# Patient Record
Sex: MALE | Race: WHITE | NOT HISPANIC OR LATINO | Employment: OTHER | URBAN - METROPOLITAN AREA
[De-identification: names, ages, dates, MRNs, and addresses within clinical notes are randomized per-mention and may not be internally consistent; named-entity substitution may affect disease eponyms.]

---

## 2018-08-15 ENCOUNTER — HOSPITAL ENCOUNTER (INPATIENT)
Facility: MEDICAL CENTER | Age: 68
LOS: 5 days | DRG: 602 | End: 2018-08-20
Attending: EMERGENCY MEDICINE | Admitting: HOSPITALIST
Payer: MEDICARE

## 2018-08-15 ENCOUNTER — HOSPITAL ENCOUNTER (OUTPATIENT)
Dept: RADIOLOGY | Facility: MEDICAL CENTER | Age: 68
End: 2018-08-15

## 2018-08-15 ENCOUNTER — APPOINTMENT (OUTPATIENT)
Dept: RADIOLOGY | Facility: MEDICAL CENTER | Age: 68
DRG: 602 | End: 2018-08-15
Attending: EMERGENCY MEDICINE
Payer: MEDICARE

## 2018-08-15 DIAGNOSIS — I48.21 PERMANENT ATRIAL FIBRILLATION (HCC): ICD-10-CM

## 2018-08-15 DIAGNOSIS — Z87.39 HISTORY OF OSTEOMYELITIS: ICD-10-CM

## 2018-08-15 DIAGNOSIS — L03.115 CELLULITIS OF RIGHT LEG: ICD-10-CM

## 2018-08-15 LAB
ALBUMIN SERPL BCP-MCNC: 3.2 G/DL (ref 3.2–4.9)
ALBUMIN/GLOB SERPL: 1.4 G/DL
ALP SERPL-CCNC: 57 U/L (ref 30–99)
ALT SERPL-CCNC: 22 U/L (ref 2–50)
ANION GAP SERPL CALC-SCNC: 9 MMOL/L (ref 0–11.9)
ANISOCYTOSIS BLD QL SMEAR: ABNORMAL
AST SERPL-CCNC: 30 U/L (ref 12–45)
BASOPHILS # BLD AUTO: 0 % (ref 0–1.8)
BASOPHILS # BLD: 0 K/UL (ref 0–0.12)
BILIRUB SERPL-MCNC: 0.9 MG/DL (ref 0.1–1.5)
BNP SERPL-MCNC: 1808 PG/ML (ref 0–100)
BUN SERPL-MCNC: 58 MG/DL (ref 8–22)
CALCIUM SERPL-MCNC: 8 MG/DL (ref 8.5–10.5)
CHLORIDE SERPL-SCNC: 102 MMOL/L (ref 96–112)
CO2 SERPL-SCNC: 22 MMOL/L (ref 20–33)
CREAT SERPL-MCNC: 2.25 MG/DL (ref 0.5–1.4)
CRP SERPL HS-MCNC: 30.72 MG/DL (ref 0–0.75)
EOSINOPHIL # BLD AUTO: 0 K/UL (ref 0–0.51)
EOSINOPHIL NFR BLD: 0 % (ref 0–6.9)
ERYTHROCYTE [DISTWIDTH] IN BLOOD BY AUTOMATED COUNT: 51.1 FL (ref 35.9–50)
ERYTHROCYTE [SEDIMENTATION RATE] IN BLOOD BY WESTERGREN METHOD: 33 MM/HOUR (ref 0–20)
GLOBULIN SER CALC-MCNC: 2.3 G/DL (ref 1.9–3.5)
GLUCOSE SERPL-MCNC: 79 MG/DL (ref 65–99)
HCT VFR BLD AUTO: 33.7 % (ref 42–52)
HGB BLD-MCNC: 10.7 G/DL (ref 14–18)
INR PPP: 4.16 (ref 0.87–1.13)
LACTATE BLD-SCNC: 2 MMOL/L (ref 0.5–2)
LYMPHOCYTES # BLD AUTO: 0.14 K/UL (ref 1–4.8)
LYMPHOCYTES NFR BLD: 4.4 % (ref 22–41)
MANUAL DIFF BLD: ABNORMAL
MCH RBC QN AUTO: 25.7 PG (ref 27–33)
MCHC RBC AUTO-ENTMCNC: 31.8 G/DL (ref 33.7–35.3)
MCV RBC AUTO: 80.8 FL (ref 81.4–97.8)
MICROCYTES BLD QL SMEAR: ABNORMAL
MONOCYTES # BLD AUTO: 0.09 K/UL (ref 0–0.85)
MONOCYTES NFR BLD AUTO: 2.7 % (ref 0–13.4)
MORPHOLOGY BLD-IMP: NORMAL
NEUTROPHILS # BLD AUTO: 2.97 K/UL (ref 1.82–7.42)
NEUTROPHILS NFR BLD: 73.4 % (ref 44–72)
NEUTS BAND NFR BLD MANUAL: 19.5 % (ref 0–10)
NRBC # BLD AUTO: 0 K/UL
NRBC BLD-RTO: 0 /100 WBC
OVALOCYTES BLD QL SMEAR: NORMAL
PLATELET # BLD AUTO: 45 K/UL (ref 164–446)
PLATELET BLD QL SMEAR: NORMAL
PLATELETS.RETICULATED NFR BLD AUTO: 4.6 K/UL (ref 0.6–13.1)
PMV BLD AUTO: 11.6 FL (ref 9–12.9)
POIKILOCYTOSIS BLD QL SMEAR: NORMAL
POTASSIUM SERPL-SCNC: 4.7 MMOL/L (ref 3.6–5.5)
PROT SERPL-MCNC: 5.5 G/DL (ref 6–8.2)
PROTHROMBIN TIME: 40 SEC (ref 12–14.6)
RBC # BLD AUTO: 4.17 M/UL (ref 4.7–6.1)
RBC BLD AUTO: PRESENT
SODIUM SERPL-SCNC: 133 MMOL/L (ref 135–145)
TROPONIN I SERPL-MCNC: 0.18 NG/ML (ref 0–0.04)
WBC # BLD AUTO: 3.2 K/UL (ref 4.8–10.8)

## 2018-08-15 PROCEDURE — 700111 HCHG RX REV CODE 636 W/ 250 OVERRIDE (IP): Performed by: EMERGENCY MEDICINE

## 2018-08-15 PROCEDURE — 85007 BL SMEAR W/DIFF WBC COUNT: CPT

## 2018-08-15 PROCEDURE — 85027 COMPLETE CBC AUTOMATED: CPT

## 2018-08-15 PROCEDURE — 93005 ELECTROCARDIOGRAM TRACING: CPT | Performed by: EMERGENCY MEDICINE

## 2018-08-15 PROCEDURE — 83880 ASSAY OF NATRIURETIC PEPTIDE: CPT

## 2018-08-15 PROCEDURE — 83605 ASSAY OF LACTIC ACID: CPT

## 2018-08-15 PROCEDURE — 700111 HCHG RX REV CODE 636 W/ 250 OVERRIDE (IP): Performed by: HOSPITALIST

## 2018-08-15 PROCEDURE — 700101 HCHG RX REV CODE 250: Performed by: EMERGENCY MEDICINE

## 2018-08-15 PROCEDURE — 770020 HCHG ROOM/CARE - TELE (206)

## 2018-08-15 PROCEDURE — 85610 PROTHROMBIN TIME: CPT

## 2018-08-15 PROCEDURE — 87040 BLOOD CULTURE FOR BACTERIA: CPT

## 2018-08-15 PROCEDURE — 700105 HCHG RX REV CODE 258: Performed by: HOSPITALIST

## 2018-08-15 PROCEDURE — A9270 NON-COVERED ITEM OR SERVICE: HCPCS | Performed by: EMERGENCY MEDICINE

## 2018-08-15 PROCEDURE — 99222 1ST HOSP IP/OBS MODERATE 55: CPT | Performed by: HOSPITALIST

## 2018-08-15 PROCEDURE — 700105 HCHG RX REV CODE 258: Performed by: EMERGENCY MEDICINE

## 2018-08-15 PROCEDURE — 73700 CT LOWER EXTREMITY W/O DYE: CPT | Mod: RT

## 2018-08-15 PROCEDURE — 93971 EXTREMITY STUDY: CPT

## 2018-08-15 PROCEDURE — 85652 RBC SED RATE AUTOMATED: CPT

## 2018-08-15 PROCEDURE — 700102 HCHG RX REV CODE 250 W/ 637 OVERRIDE(OP): Performed by: EMERGENCY MEDICINE

## 2018-08-15 PROCEDURE — 84484 ASSAY OF TROPONIN QUANT: CPT

## 2018-08-15 PROCEDURE — 80053 COMPREHEN METABOLIC PANEL: CPT

## 2018-08-15 PROCEDURE — 85055 RETICULATED PLATELET ASSAY: CPT

## 2018-08-15 PROCEDURE — 86140 C-REACTIVE PROTEIN: CPT

## 2018-08-15 RX ORDER — ESOMEPRAZOLE MAGNESIUM 40 MG/1
40 CAPSULE, DELAYED RELEASE ORAL
COMMUNITY

## 2018-08-15 RX ORDER — POLYETHYLENE GLYCOL 3350 17 G/17G
1 POWDER, FOR SOLUTION ORAL
Status: DISCONTINUED | OUTPATIENT
Start: 2018-08-15 | End: 2018-08-20 | Stop reason: HOSPADM

## 2018-08-15 RX ORDER — CARVEDILOL 25 MG/1
25 TABLET ORAL 2 TIMES DAILY WITH MEALS
Status: ON HOLD | COMMUNITY
End: 2018-08-20

## 2018-08-15 RX ORDER — BISACODYL 10 MG
10 SUPPOSITORY, RECTAL RECTAL
Status: DISCONTINUED | OUTPATIENT
Start: 2018-08-15 | End: 2018-08-20 | Stop reason: HOSPADM

## 2018-08-15 RX ORDER — SODIUM CHLORIDE 9 MG/ML
30 INJECTION, SOLUTION INTRAVENOUS
Status: DISCONTINUED | OUTPATIENT
Start: 2018-08-15 | End: 2018-08-20 | Stop reason: HOSPADM

## 2018-08-15 RX ORDER — ONDANSETRON 2 MG/ML
4 INJECTION INTRAMUSCULAR; INTRAVENOUS EVERY 4 HOURS PRN
Status: DISCONTINUED | OUTPATIENT
Start: 2018-08-15 | End: 2018-08-20 | Stop reason: HOSPADM

## 2018-08-15 RX ORDER — OXYCODONE HYDROCHLORIDE 5 MG/1
5 TABLET ORAL
Status: DISCONTINUED | OUTPATIENT
Start: 2018-08-15 | End: 2018-08-20 | Stop reason: HOSPADM

## 2018-08-15 RX ORDER — ONDANSETRON 4 MG/1
4 TABLET, ORALLY DISINTEGRATING ORAL EVERY 4 HOURS PRN
Status: DISCONTINUED | OUTPATIENT
Start: 2018-08-15 | End: 2018-08-20 | Stop reason: HOSPADM

## 2018-08-15 RX ORDER — BUMETANIDE 1 MG/1
2 TABLET ORAL EVERY MORNING
COMMUNITY

## 2018-08-15 RX ORDER — OXYCODONE HYDROCHLORIDE AND ACETAMINOPHEN 5; 325 MG/1; MG/1
1 TABLET ORAL ONCE
Status: COMPLETED | OUTPATIENT
Start: 2018-08-15 | End: 2018-08-15

## 2018-08-15 RX ORDER — SODIUM CHLORIDE 9 MG/ML
500 INJECTION, SOLUTION INTRAVENOUS
Status: DISCONTINUED | OUTPATIENT
Start: 2018-08-15 | End: 2018-08-20 | Stop reason: HOSPADM

## 2018-08-15 RX ORDER — LOSARTAN POTASSIUM 100 MG/1
150 TABLET ORAL EVERY MORNING
Status: ON HOLD | COMMUNITY
End: 2018-08-20

## 2018-08-15 RX ORDER — SODIUM CHLORIDE 9 MG/ML
INJECTION, SOLUTION INTRAVENOUS CONTINUOUS
Status: DISCONTINUED | OUTPATIENT
Start: 2018-08-15 | End: 2018-08-16

## 2018-08-15 RX ORDER — OXYCODONE HYDROCHLORIDE 10 MG/1
10 TABLET ORAL
Status: DISCONTINUED | OUTPATIENT
Start: 2018-08-15 | End: 2018-08-20 | Stop reason: HOSPADM

## 2018-08-15 RX ORDER — ATORVASTATIN CALCIUM 20 MG/1
20 TABLET, FILM COATED ORAL EVERY MORNING
COMMUNITY

## 2018-08-15 RX ORDER — LEVOTHYROXINE SODIUM 0.1 MG/1
100 TABLET ORAL
COMMUNITY

## 2018-08-15 RX ORDER — FEBUXOSTAT 80 MG/1
80 TABLET, FILM COATED ORAL EVERY MORNING
COMMUNITY

## 2018-08-15 RX ORDER — AMOXICILLIN 250 MG
2 CAPSULE ORAL 2 TIMES DAILY
Status: DISCONTINUED | OUTPATIENT
Start: 2018-08-15 | End: 2018-08-20 | Stop reason: HOSPADM

## 2018-08-15 RX ORDER — CLINDAMYCIN PHOSPHATE 900 MG/50ML
900 INJECTION, SOLUTION INTRAVENOUS ONCE
Status: COMPLETED | OUTPATIENT
Start: 2018-08-15 | End: 2018-08-15

## 2018-08-15 RX ADMIN — PIPERACILLIN AND TAZOBACTAM 3.38 G: 3; .375 INJECTION, POWDER, LYOPHILIZED, FOR SOLUTION INTRAVENOUS; PARENTERAL at 20:48

## 2018-08-15 RX ADMIN — SODIUM CHLORIDE: 9 INJECTION, SOLUTION INTRAVENOUS at 23:58

## 2018-08-15 RX ADMIN — OXYCODONE HYDROCHLORIDE AND ACETAMINOPHEN 1 TABLET: 5; 325 TABLET ORAL at 20:48

## 2018-08-15 RX ADMIN — AMPICILLIN AND SULBACTAM 3 G: 2; 1 INJECTION, POWDER, FOR SOLUTION INTRAVENOUS at 23:57

## 2018-08-15 RX ADMIN — VANCOMYCIN HYDROCHLORIDE 2200 MG: 100 INJECTION, POWDER, LYOPHILIZED, FOR SOLUTION INTRAVENOUS at 23:09

## 2018-08-15 RX ADMIN — CLINDAMYCIN IN 5 PERCENT DEXTROSE 900 MG: 18 INJECTION, SOLUTION INTRAVENOUS at 21:54

## 2018-08-15 ASSESSMENT — PATIENT HEALTH QUESTIONNAIRE - PHQ9
1. LITTLE INTEREST OR PLEASURE IN DOING THINGS: NOT AT ALL
2. FEELING DOWN, DEPRESSED, IRRITABLE, OR HOPELESS: NOT AT ALL
SUM OF ALL RESPONSES TO PHQ9 QUESTIONS 1 AND 2: 0

## 2018-08-15 ASSESSMENT — COPD QUESTIONNAIRES
COPD SCREENING SCORE: 2
DURING THE PAST 4 WEEKS HOW MUCH DID YOU FEEL SHORT OF BREATH: NONE/LITTLE OF THE TIME
HAVE YOU SMOKED AT LEAST 100 CIGARETTES IN YOUR ENTIRE LIFE: NO/DON'T KNOW
IN THE PAST 12 MONTHS DO YOU DO LESS THAN YOU USED TO BECAUSE OF YOUR BREATHING PROBLEMS: DISAGREE/UNSURE
DO YOU EVER COUGH UP ANY MUCUS OR PHLEGM?: NO/ONLY WITH OCCASIONAL COLDS OR INFECTIONS

## 2018-08-15 ASSESSMENT — COGNITIVE AND FUNCTIONAL STATUS - GENERAL
CLIMB 3 TO 5 STEPS WITH RAILING: A LOT
MOVING FROM LYING ON BACK TO SITTING ON SIDE OF FLAT BED: A LITTLE
DAILY ACTIVITIY SCORE: 23
DRESSING REGULAR UPPER BODY CLOTHING: A LITTLE
WALKING IN HOSPITAL ROOM: A LOT
MOBILITY SCORE: 18
SUGGESTED CMS G CODE MODIFIER MOBILITY: CK
SUGGESTED CMS G CODE MODIFIER DAILY ACTIVITY: CI
STANDING UP FROM CHAIR USING ARMS: A LITTLE

## 2018-08-15 ASSESSMENT — LIFESTYLE VARIABLES
ALCOHOL_USE: NO
EVER_SMOKED: NEVER

## 2018-08-16 ENCOUNTER — APPOINTMENT (OUTPATIENT)
Dept: RADIOLOGY | Facility: MEDICAL CENTER | Age: 68
DRG: 602 | End: 2018-08-16
Attending: HOSPITALIST
Payer: MEDICARE

## 2018-08-16 PROBLEM — M10.9 GOUT: Status: ACTIVE | Noted: 2018-08-16

## 2018-08-16 PROBLEM — D69.6 THROMBOCYTOPENIA (HCC): Status: ACTIVE | Noted: 2018-08-16

## 2018-08-16 PROBLEM — D64.9 ANEMIA: Status: ACTIVE | Noted: 2018-08-16

## 2018-08-16 PROBLEM — N18.30 CKD (CHRONIC KIDNEY DISEASE) STAGE 3, GFR 30-59 ML/MIN: Status: ACTIVE | Noted: 2018-08-16

## 2018-08-16 PROBLEM — L03.115 CELLULITIS OF LEG, RIGHT: Status: ACTIVE | Noted: 2018-08-16

## 2018-08-16 PROBLEM — N17.9 AKI (ACUTE KIDNEY INJURY) (HCC): Status: ACTIVE | Noted: 2018-08-16

## 2018-08-16 PROBLEM — D61.818 PANCYTOPENIA (HCC): Status: ACTIVE | Noted: 2018-08-16

## 2018-08-16 PROBLEM — I50.9 CHF (CONGESTIVE HEART FAILURE) (HCC): Status: ACTIVE | Noted: 2018-08-16

## 2018-08-16 PROBLEM — K21.9 GERD (GASTROESOPHAGEAL REFLUX DISEASE): Status: ACTIVE | Noted: 2018-08-16

## 2018-08-16 PROBLEM — E78.5 HLD (HYPERLIPIDEMIA): Status: ACTIVE | Noted: 2018-08-16

## 2018-08-16 PROBLEM — I48.91 ATRIAL FIBRILLATION (HCC): Status: ACTIVE | Noted: 2018-08-16

## 2018-08-16 PROBLEM — A41.9 SEPSIS DUE TO CELLULITIS (HCC): Status: ACTIVE | Noted: 2018-08-16

## 2018-08-16 PROBLEM — I10 HTN (HYPERTENSION): Status: ACTIVE | Noted: 2018-08-16

## 2018-08-16 PROBLEM — L03.90 SEPSIS DUE TO CELLULITIS (HCC): Status: ACTIVE | Noted: 2018-08-16

## 2018-08-16 PROBLEM — R79.89 ELEVATED TROPONIN: Status: ACTIVE | Noted: 2018-08-16

## 2018-08-16 LAB
ALBUMIN SERPL BCP-MCNC: 2.9 G/DL (ref 3.2–4.9)
ALBUMIN/GLOB SERPL: 1.1 G/DL
ALP SERPL-CCNC: 58 U/L (ref 30–99)
ALT SERPL-CCNC: 20 U/L (ref 2–50)
ANION GAP SERPL CALC-SCNC: 9 MMOL/L (ref 0–11.9)
ANISOCYTOSIS BLD QL SMEAR: ABNORMAL
APPEARANCE UR: CLEAR
APTT PPP: 41.4 SEC (ref 24.7–36)
AST SERPL-CCNC: 23 U/L (ref 12–45)
BACTERIA #/AREA URNS HPF: NEGATIVE /HPF
BASOPHILS # BLD AUTO: 0 % (ref 0–1.8)
BASOPHILS # BLD: 0 K/UL (ref 0–0.12)
BILIRUB SERPL-MCNC: 0.8 MG/DL (ref 0.1–1.5)
BILIRUB UR QL STRIP.AUTO: NEGATIVE
BNP SERPL-MCNC: 867 PG/ML (ref 0–100)
BUN SERPL-MCNC: 63 MG/DL (ref 8–22)
CALCIUM SERPL-MCNC: 8.2 MG/DL (ref 8.5–10.5)
CHLORIDE SERPL-SCNC: 102 MMOL/L (ref 96–112)
CHOLEST SERPL-MCNC: 80 MG/DL (ref 100–199)
CO2 SERPL-SCNC: 23 MMOL/L (ref 20–33)
COLOR UR: YELLOW
CREAT SERPL-MCNC: 2.18 MG/DL (ref 0.5–1.4)
EOSINOPHIL # BLD AUTO: 0 K/UL (ref 0–0.51)
EOSINOPHIL NFR BLD: 0 % (ref 0–6.9)
EPI CELLS #/AREA URNS HPF: NEGATIVE /HPF
ERYTHROCYTE [DISTWIDTH] IN BLOOD BY AUTOMATED COUNT: 52.9 FL (ref 35.9–50)
EST. AVERAGE GLUCOSE BLD GHB EST-MCNC: 123 MG/DL
FERRITIN SERPL-MCNC: 61.7 NG/ML (ref 22–322)
FOLATE SERPL-MCNC: >24 NG/ML
GLOBULIN SER CALC-MCNC: 2.6 G/DL (ref 1.9–3.5)
GLUCOSE SERPL-MCNC: 114 MG/DL (ref 65–99)
GLUCOSE UR STRIP.AUTO-MCNC: NEGATIVE MG/DL
HBA1C MFR BLD: 5.9 % (ref 0–5.6)
HCT VFR BLD AUTO: 32.9 % (ref 42–52)
HDLC SERPL-MCNC: 12 MG/DL
HGB BLD-MCNC: 10.1 G/DL (ref 14–18)
HGB RETIC QN AUTO: 25.5 PG/CELL (ref 29–35)
HYALINE CASTS #/AREA URNS LPF: ABNORMAL /LPF
IMM RETICS NFR: 7.7 % (ref 9.3–17.4)
INR PPP: 4.72 (ref 0.87–1.13)
IRON SATN MFR SERPL: ABNORMAL % (ref 15–55)
IRON SERPL-MCNC: <10 UG/DL (ref 50–180)
KETONES UR STRIP.AUTO-MCNC: NEGATIVE MG/DL
LACTATE BLD-SCNC: 1.7 MMOL/L (ref 0.5–2)
LACTATE BLD-SCNC: 1.8 MMOL/L (ref 0.5–2)
LACTATE BLD-SCNC: 2 MMOL/L (ref 0.5–2)
LDLC SERPL CALC-MCNC: 14 MG/DL
LEUKOCYTE ESTERASE UR QL STRIP.AUTO: NEGATIVE
LG PLATELETS BLD QL SMEAR: NORMAL
LYMPHOCYTES # BLD AUTO: 0.12 K/UL (ref 1–4.8)
LYMPHOCYTES NFR BLD: 6.2 % (ref 22–41)
MANUAL DIFF BLD: ABNORMAL
MCH RBC QN AUTO: 25.1 PG (ref 27–33)
MCHC RBC AUTO-ENTMCNC: 30.7 G/DL (ref 33.7–35.3)
MCV RBC AUTO: 81.6 FL (ref 81.4–97.8)
METAMYELOCYTES NFR BLD MANUAL: 2.7 %
MICRO URNS: ABNORMAL
MICROCYTES BLD QL SMEAR: ABNORMAL
MONOCYTES # BLD AUTO: 0.09 K/UL (ref 0–0.85)
MONOCYTES NFR BLD AUTO: 4.4 % (ref 0–13.4)
MORPHOLOGY BLD-IMP: NORMAL
NEUTROPHILS # BLD AUTO: 1.73 K/UL (ref 1.82–7.42)
NEUTROPHILS NFR BLD: 76.1 % (ref 44–72)
NEUTS BAND NFR BLD MANUAL: 10.6 % (ref 0–10)
NITRITE UR QL STRIP.AUTO: NEGATIVE
NRBC # BLD AUTO: 0 K/UL
NRBC BLD-RTO: 0 /100 WBC
OVALOCYTES BLD QL SMEAR: NORMAL
PH UR STRIP.AUTO: 5 [PH]
PLATELET # BLD AUTO: 41 K/UL (ref 164–446)
PLATELET BLD QL SMEAR: NORMAL
PLATELETS.RETICULATED NFR BLD AUTO: 4.4 K/UL (ref 0.6–13.1)
PMV BLD AUTO: 10.5 FL (ref 9–12.9)
POIKILOCYTOSIS BLD QL SMEAR: NORMAL
POTASSIUM SERPL-SCNC: 4.5 MMOL/L (ref 3.6–5.5)
PROT SERPL-MCNC: 5.5 G/DL (ref 6–8.2)
PROT UR QL STRIP: 300 MG/DL
PROTHROMBIN TIME: 44.2 SEC (ref 12–14.6)
RBC # BLD AUTO: 4.03 M/UL (ref 4.7–6.1)
RBC # URNS HPF: ABNORMAL /HPF
RBC BLD AUTO: PRESENT
RBC UR QL AUTO: NEGATIVE
RETICS # AUTO: 0.05 M/UL (ref 0.04–0.06)
RETICS/RBC NFR: 1.3 % (ref 0.8–2.1)
SODIUM SERPL-SCNC: 134 MMOL/L (ref 135–145)
SP GR UR STRIP.AUTO: 1.02
TIBC SERPL-MCNC: 315 UG/DL (ref 250–450)
TRIGL SERPL-MCNC: 270 MG/DL (ref 0–149)
TROPONIN I SERPL-MCNC: 0.16 NG/ML (ref 0–0.04)
TROPONIN I SERPL-MCNC: 0.19 NG/ML (ref 0–0.04)
TSH SERPL DL<=0.005 MIU/L-ACNC: 4.05 UIU/ML (ref 0.38–5.33)
UROBILINOGEN UR STRIP.AUTO-MCNC: 1 MG/DL
VANCOMYCIN SERPL-MCNC: 27.2 UG/ML
VIT B12 SERPL-MCNC: 593 PG/ML (ref 211–911)
WBC # BLD AUTO: 2 K/UL (ref 4.8–10.8)
WBC #/AREA URNS HPF: ABNORMAL /HPF

## 2018-08-16 PROCEDURE — 76775 US EXAM ABDO BACK WALL LIM: CPT

## 2018-08-16 PROCEDURE — 99285 EMERGENCY DEPT VISIT HI MDM: CPT

## 2018-08-16 PROCEDURE — 85610 PROTHROMBIN TIME: CPT

## 2018-08-16 PROCEDURE — 84484 ASSAY OF TROPONIN QUANT: CPT

## 2018-08-16 PROCEDURE — 82728 ASSAY OF FERRITIN: CPT

## 2018-08-16 PROCEDURE — 81001 URINALYSIS AUTO W/SCOPE: CPT

## 2018-08-16 PROCEDURE — 700105 HCHG RX REV CODE 258: Performed by: HOSPITALIST

## 2018-08-16 PROCEDURE — 96367 TX/PROPH/DG ADDL SEQ IV INF: CPT

## 2018-08-16 PROCEDURE — 770020 HCHG ROOM/CARE - TELE (206)

## 2018-08-16 PROCEDURE — 36415 COLL VENOUS BLD VENIPUNCTURE: CPT

## 2018-08-16 PROCEDURE — 80202 ASSAY OF VANCOMYCIN: CPT

## 2018-08-16 PROCEDURE — 84443 ASSAY THYROID STIM HORMONE: CPT

## 2018-08-16 PROCEDURE — 83036 HEMOGLOBIN GLYCOSYLATED A1C: CPT

## 2018-08-16 PROCEDURE — 80053 COMPREHEN METABOLIC PANEL: CPT

## 2018-08-16 PROCEDURE — 99233 SBSQ HOSP IP/OBS HIGH 50: CPT | Performed by: HOSPITALIST

## 2018-08-16 PROCEDURE — 96375 TX/PRO/DX INJ NEW DRUG ADDON: CPT

## 2018-08-16 PROCEDURE — 83550 IRON BINDING TEST: CPT

## 2018-08-16 PROCEDURE — 83540 ASSAY OF IRON: CPT

## 2018-08-16 PROCEDURE — 85007 BL SMEAR W/DIFF WBC COUNT: CPT

## 2018-08-16 PROCEDURE — 82607 VITAMIN B-12: CPT

## 2018-08-16 PROCEDURE — 85027 COMPLETE CBC AUTOMATED: CPT

## 2018-08-16 PROCEDURE — 85730 THROMBOPLASTIN TIME PARTIAL: CPT

## 2018-08-16 PROCEDURE — 80061 LIPID PANEL: CPT

## 2018-08-16 PROCEDURE — 83880 ASSAY OF NATRIURETIC PEPTIDE: CPT

## 2018-08-16 PROCEDURE — 85046 RETICYTE/HGB CONCENTRATE: CPT

## 2018-08-16 PROCEDURE — 83605 ASSAY OF LACTIC ACID: CPT

## 2018-08-16 PROCEDURE — 700111 HCHG RX REV CODE 636 W/ 250 OVERRIDE (IP): Performed by: HOSPITALIST

## 2018-08-16 PROCEDURE — A9270 NON-COVERED ITEM OR SERVICE: HCPCS | Performed by: HOSPITALIST

## 2018-08-16 PROCEDURE — 96365 THER/PROPH/DIAG IV INF INIT: CPT

## 2018-08-16 PROCEDURE — 700102 HCHG RX REV CODE 250 W/ 637 OVERRIDE(OP): Performed by: HOSPITALIST

## 2018-08-16 PROCEDURE — 85055 RETICULATED PLATELET ASSAY: CPT

## 2018-08-16 PROCEDURE — 82746 ASSAY OF FOLIC ACID SERUM: CPT

## 2018-08-16 RX ORDER — OMEPRAZOLE 20 MG/1
20 CAPSULE, DELAYED RELEASE ORAL DAILY
Status: DISCONTINUED | OUTPATIENT
Start: 2018-08-16 | End: 2018-08-20 | Stop reason: HOSPADM

## 2018-08-16 RX ORDER — BUMETANIDE 1 MG/1
2 TABLET ORAL EVERY MORNING
Status: DISCONTINUED | OUTPATIENT
Start: 2018-08-16 | End: 2018-08-16

## 2018-08-16 RX ORDER — FUROSEMIDE 10 MG/ML
20 INJECTION INTRAMUSCULAR; INTRAVENOUS
Status: DISCONTINUED | OUTPATIENT
Start: 2018-08-16 | End: 2018-08-18

## 2018-08-16 RX ORDER — CARVEDILOL 25 MG/1
25 TABLET ORAL 2 TIMES DAILY WITH MEALS
Status: DISCONTINUED | OUTPATIENT
Start: 2018-08-16 | End: 2018-08-18

## 2018-08-16 RX ORDER — ESOMEPRAZOLE MAGNESIUM 40 MG/1
40 CAPSULE, DELAYED RELEASE ORAL
Status: DISCONTINUED | OUTPATIENT
Start: 2018-08-16 | End: 2018-08-16

## 2018-08-16 RX ORDER — FEBUXOSTAT 40 MG/1
80 TABLET, FILM COATED ORAL EVERY MORNING
Status: DISCONTINUED | OUTPATIENT
Start: 2018-08-16 | End: 2018-08-20 | Stop reason: HOSPADM

## 2018-08-16 RX ORDER — LEVOTHYROXINE SODIUM 0.1 MG/1
100 TABLET ORAL
Status: DISCONTINUED | OUTPATIENT
Start: 2018-08-16 | End: 2018-08-20 | Stop reason: HOSPADM

## 2018-08-16 RX ORDER — ATORVASTATIN CALCIUM 20 MG/1
20 TABLET, FILM COATED ORAL EVERY MORNING
Status: DISCONTINUED | OUTPATIENT
Start: 2018-08-16 | End: 2018-08-20 | Stop reason: HOSPADM

## 2018-08-16 RX ADMIN — LEVOTHYROXINE SODIUM 100 MCG: 100 TABLET ORAL at 05:36

## 2018-08-16 RX ADMIN — OXYCODONE HYDROCHLORIDE 10 MG: 10 TABLET ORAL at 17:15

## 2018-08-16 RX ADMIN — AMPICILLIN AND SULBACTAM 3 G: 2; 1 INJECTION, POWDER, FOR SOLUTION INTRAVENOUS at 12:24

## 2018-08-16 RX ADMIN — FEBUXOSTAT 80 MG: 40 TABLET ORAL at 05:37

## 2018-08-16 RX ADMIN — OMEPRAZOLE 20 MG: 20 CAPSULE, DELAYED RELEASE ORAL at 05:36

## 2018-08-16 RX ADMIN — FUROSEMIDE 20 MG: 10 INJECTION, SOLUTION INTRAMUSCULAR; INTRAVENOUS at 17:15

## 2018-08-16 RX ADMIN — FUROSEMIDE 20 MG: 10 INJECTION, SOLUTION INTRAMUSCULAR; INTRAVENOUS at 01:52

## 2018-08-16 RX ADMIN — ATORVASTATIN CALCIUM 20 MG: 20 TABLET, FILM COATED ORAL at 05:36

## 2018-08-16 RX ADMIN — AMPICILLIN AND SULBACTAM 3 G: 2; 1 INJECTION, POWDER, FOR SOLUTION INTRAVENOUS at 23:06

## 2018-08-16 RX ADMIN — AMPICILLIN AND SULBACTAM 3 G: 2; 1 INJECTION, POWDER, FOR SOLUTION INTRAVENOUS at 05:36

## 2018-08-16 RX ADMIN — FUROSEMIDE 20 MG: 10 INJECTION, SOLUTION INTRAMUSCULAR; INTRAVENOUS at 05:36

## 2018-08-16 RX ADMIN — CARVEDILOL 25 MG: 25 TABLET, FILM COATED ORAL at 08:34

## 2018-08-16 RX ADMIN — CARVEDILOL 25 MG: 25 TABLET, FILM COATED ORAL at 17:15

## 2018-08-16 RX ADMIN — AMPICILLIN AND SULBACTAM 3 G: 2; 1 INJECTION, POWDER, FOR SOLUTION INTRAVENOUS at 17:31

## 2018-08-16 ASSESSMENT — ENCOUNTER SYMPTOMS
SPEECH CHANGE: 0
MUSCULOSKELETAL NEGATIVE: 1
CHILLS: 1
ABDOMINAL PAIN: 0
SENSORY CHANGE: 0
NEUROLOGICAL NEGATIVE: 1
COUGH: 0
EYES NEGATIVE: 1
HEMOPTYSIS: 0
DEPRESSION: 0
PALPITATIONS: 0
BRUISES/BLEEDS EASILY: 0
BRUISES/BLEEDS EASILY: 1
SHORTNESS OF BREATH: 0
VOMITING: 0
PSYCHIATRIC NEGATIVE: 1
BLURRED VISION: 0
DIZZINESS: 0
GASTROINTESTINAL NEGATIVE: 1
DIAPHORESIS: 1
DOUBLE VISION: 0
FLANK PAIN: 0
MYALGIAS: 0
FOCAL WEAKNESS: 0
NAUSEA: 0
HEARTBURN: 0
HALLUCINATIONS: 0
EYE DISCHARGE: 0
WEAKNESS: 0
FEVER: 1

## 2018-08-16 ASSESSMENT — PAIN SCALES - GENERAL
PAINLEVEL_OUTOF10: 3
PAINLEVEL_OUTOF10: 0
PAINLEVEL_OUTOF10: 6
PAINLEVEL_OUTOF10: 0

## 2018-08-16 ASSESSMENT — LIFESTYLE VARIABLES: SUBSTANCE_ABUSE: 0

## 2018-08-16 NOTE — ASSESSMENT & PLAN NOTE
Unsure baseline? Need ashish from [facility]   Hold losartan  check urine lites  Check renal ultrasound  Continue to monitor renal function

## 2018-08-16 NOTE — PROGRESS NOTES
"Pharmacy Kinetics 68 y.o. male on vancomycin day # 1 8/15/2018    Indication for Treatment: SSTI    Pertinent history per medical record: Admitted on 8/15/2018 for lower extremity pain and swelling. Patient has known history of osteomyelitis. Diagnosed as cellulitis, empiric antibiotic therapy initiated.    Other antibiotics: Ampicillin/sulbactam 3 g IV q6h    Allergies: Allopurinol; Ertapenem; and Prevacid [fd&c red #40-lansoprazole]     List concerns for renal function: Elevated BUN/SCr ratio, low albumin, age    Pertinent cultures to date:     None    Recent Labs      08/15/18   1835   WBC  3.2*   NEUTSPOLYS  73.40*   BANDSSTABS  19.50*     Recent Labs      08/15/18   1835   BUN  58*   CREATININE  2.25*   ALBUMIN  3.2     Blood pressure 114/77, pulse 72, temperature 36.6 °C (97.8 °F), resp. rate 12, height 1.88 m (6' 2\"), weight 86.2 kg (190 lb), SpO2 95 %. Temp (24hrs), Av.6 °C (97.8 °F), Min:36.6 °C (97.8 °F), Max:36.6 °C (97.8 °F)      A/P   1. Vancomycin dose change: Give 2200 mg IV loading dose  2. Next vancomycin level: Random 12 hour level  3. Goal trough: 12-16 mcg/mL  4. Comments: Therapy with vancomycin initiated empirically for SSTI. With several concerns for accumulation, will provide only a loading dose at this time and follow up on random level thereafter in order to assess clearance and further dosing requirements.  Recommend de-escalation if MRSA can be ruled out.  Pharmacy will continue to follow.     Artemio Cramer PharmD, BCPS    "

## 2018-08-16 NOTE — PROGRESS NOTES
[facility] Medicine Daily Progress Note    Date of Service  8/16/2018    Chief Complaint  68 y.o. male admitted 8/15/2018 with right leg swelling      Interval Problem Update  Patient has low platelets of 41    Patient has known hx of pancytopenia.. Wbc and hb within his known low ranges    Afebrile    Right leg redness and swelling    Known chronic kidney disease    Cr is 2.1    Last bnp elevated at 1800    Consultants/Specialty  none    Disposition  home    Review of Systems  Review of Systems   Constitutional: Positive for malaise/fatigue.   HENT: Negative.    Eyes: Negative.    Cardiovascular: Positive for leg swelling.   Gastrointestinal: Negative.    Genitourinary: Negative.    Musculoskeletal: Negative.    Skin: Negative.    Neurological: Negative.    Endo/Heme/Allergies: Bruises/bleeds easily.   Psychiatric/Behavioral: Negative.    All other systems reviewed and are negative.       Physical Exam  Blood Pressure : 118/75   Temperature: 36.5 °C (97.7 °F)   Pulse: 82   Respiration: 18   Pulse Oximetry: 92 %     Physical Exam   Constitutional: He is oriented to person, place, and time. No distress.   Eyes: Left eye exhibits no discharge. No scleral icterus.   Neck: JVD present.   Cardiovascular: Normal rate.  An irregularly irregular rhythm present. Exam reveals no gallop and no friction rub.    No murmur heard.  Pulmonary/Chest: Effort normal. No respiratory distress. He has no wheezes. He has no rales.   Abdominal: Soft. He exhibits distension. There is no tenderness. There is no rebound.   Musculoskeletal: He exhibits edema (right leg swelling).   Neurological: He is alert and oriented to person, place, and time. No cranial nerve deficit. Coordination normal.   Skin: He is not diaphoretic. There is erythema (right lower leg).       Fluids    Intake/Output Summary (Last 24 hours) at 08/16/18 1132  Last data filed at 08/16/18 0735   Gross per 24 hour   Intake             1820 ml   Output              800 ml   Net              1020 ml       Laboratory  Recent Labs      08/15/18   1835  08/16/18   0244   WBC  3.2*  2.0*   RBC  4.17*  4.03*   HEMOGLOBIN  10.7*  10.1*   HEMATOCRIT  33.7*  32.9*   MCV  80.8*  81.6   MCH  25.7*  25.1*   MCHC  31.8*  30.7*   RDW  51.1*  52.9*   PLATELETCT  45*  41*   MPV  11.6  10.5     Recent Labs      08/15/18   1835  08/16/18   0243   SODIUM  133*  134*   POTASSIUM  4.7  4.5   CHLORIDE  102  102   CO2  22  23   GLUCOSE  79  114*   BUN  58*  63*   CREATININE  2.25*  2.18*   CALCIUM  8.0*  8.2*     Recent Labs      08/15/18   1835  08/16/18   0244   APTT   --   41.4*   INR  4.16*  4.72*     Recent Labs      08/15/18   1835   BNPBTYPENAT  1808*     Recent Labs      08/16/18   0243   TRIGLYCERIDE  270*   HDL  12*   LDL  14       Imaging  US-RENAL   Final Result      1.  Unremarkable kidneys. No hydronephrosis.   2.  Prostatomegaly.      LE VENOUS DUPLEX (Specify in Comments Left, Right Or Bilateral)   Final Result      CT-EXTREMITY, LOWER W/O RIGHT   Final Result         1. Cellulitis of the and lower leg extending into the foot. No drainable soft tissue fluid collection. No radiographic evidence of tibial/fibular osteomyelitis.   2. Amputation of the second toe with osseous destruction of the second phalangeal remnant. This may be postoperative or represent osteomyelitis. Correlate clinically for wounds at this level.   3. Charcot arthropathy. No acute fracture.   4. Semiconstrained total knee arthroplasty is well seated.   5. Severe osteopenia with numerous lytic lucencies throughout the foot, likely related to osteopenia. Multiple myeloma can also have this appearance, but is considered less likely.      OUTSIDE IMAGES-DX CHEST   Final Result           Assessment/Plan  * Cellulitis of leg, right- (present on admission)   Assessment & Plan      Right foot cellulitis  IV antibiotics iv unasyn and iv vanc     follow cultures          CHF (congestive heart failure) (HCC)- (present on admission)    Assessment & Plan    Unknown baseline  Check echo    Diuresis with iv lasix 20mg q12    Cardiac diet              Pancytopenia (HCC)- (present on admission)   Assessment & Plan    Known issue    Patient refuses bone marrow biopsy at this time        Thrombocytopenia (HCC)- (present on admission)   Assessment & Plan    Hold eliquis for now    Follow cbc        CKD (chronic kidney disease) stage 3, GFR 30-59 ml/min- (present on admission)   Assessment & Plan    Avoid nsaids and nephrotoxins    diuresis    Check am bmp        Atrial fibrillation (HCC)- (present on admission)   Assessment & Plan    Chronic  On eliquis( on hold until we see platelet count improve)        Elevated troponin- (present on admission)   Assessment & Plan    Seems to be demand ischemia from sepsis and possible CHF    Follow troponins    Check echo              Anemia- (present on admission)   Assessment & Plan    chronic        HLD (hyperlipidemia)- (present on admission)   Assessment & Plan    resume statin        Gout- (present on admission)   Assessment & Plan    Resume Uloric        GERD (gastroesophageal reflux disease)- (present on admission)   Assessment & Plan    Resume omeprazole        HTN (hypertension)- (present on admission)   Assessment & Plan    Cont  Po coreg        check am cbc, bmp, bnp

## 2018-08-16 NOTE — ED PROVIDER NOTES
ED Provider Note  Chief Complaint:   Right leg pain and swelling    HPI:  Diego Serrano is a 68 y.o. male who presents with chief complaint of right leg pain and swelling.  His symptoms began around 2:00 this morning when he woke from sleep with a throbbing pain localized to the right shin.  He describes a progressively and rapidly worsening pain and swelling initially localized to the right shin, now circumferential.  Skin is described as a reddened and painful to touch.  Right foot is chronically deformed following multiple surgical procedures for osteomyelitis of the foot.  Redness and swelling today is not in an identical location.  He describes no exacerbating or alleviating factors.  He has had some associated chills and sweats, he did not take his temperature but feels as though he was febrile.  He has not had any associated shortness of breath, no chest pain, no nausea, no vomiting.  He denies any recent injury to the area.  States he is not diabetic and has had normal screening hemoglobin A1c's.      He has a past medical history of heart failure, followed in his hometown of [place].  He is currently anticoagulated on Eliquis.  Additionally he has a history of renal insufficiency and atrial fibrillation.  History of COPD, does not require supplemental oxygen at home.  He does have a history of gout as well.    He presented to [facility] and was transferred to this facility for cardiology services not available at that hospital.    Review of Systems:  See HPI for pertinent positives and negatives. All other systems negative.    Past Medical History:   has a past medical history of Atrial fibrillation (HCC); CHF (congestive heart failure) (HCC); Hypothyroidism; and Renal insufficiency.    Social History:  Social History     Social History Main Topics   • Smoking status: Never Smoker   • Smokeless tobacco: Never Used   • Alcohol use No   • Drug use: No   • Sexual activity: Not on file  "      Surgical History:   has a past surgical history that includes other orthopedic surgery.    Current Medications:  Home Medications     Reviewed by Riana Rivera (Pharmacy Tech) on 08/15/18 at 2305  Med List Status: Complete   Medication Last Dose Status   apixaban (ELIQUIS) 5mg Tab 8/15/2018 Active   atorvastatin (LIPITOR) 20 MG Tab 8/15/2018 Active   bumetanide (BUMEX) 1 MG Tab 8/15/2018 Active   carvedilol (COREG) 25 MG Tab 8/15/2018 Active   esomeprazole (NEXIUM) 40 MG delayed-release capsule 8/15/2018 Active   febuxostat (ULORIC) 80 MG Tab 8/15/2018 Active   levothyroxine (SYNTHROID) 100 MCG Tab 8/15/2018 Active   losartan (COZAAR) 100 MG Tab 8/15/2018 Active                Allergies:  Allergies   Allergen Reactions   • Allopurinol      Jan wes's reaction     • Ertapenem      Hallucinations     • Lansoprazole Diarrhea     Diarrhea   • Oxycontin [Oxycodone]      Per pt, he \"feels out of it.\"       Physical Exam:  Vital Signs: /75   Pulse 75   Temp 36.6 °C (97.8 °F)   Resp 18   Ht 1.88 m (6' 2\")   Wt 93.6 kg (206 lb 5.6 oz)   SpO2 99%   BMI 26.49 kg/m²   Constitutional: Alert, no acute distress  HENT: Moist mucus membranes, normal posterior pharynx, no intraoral lesions  Eyes: Pupils equal and reactive, normal conjunctiva  Neck: Supple, normal range of motion, no stridor  Cardiovascular: Extremities are warm and well perfused, no murmur appreciated, normal cardiac auscultation, 1+ right DP pulse.  Pulmonary: No respiratory distress, normal work of breathing, no accessory muscule usage, breath sounds clear and equal bilaterally, quiet breath sounds bilaterally consistent with COPD  Abdomen: Soft, non-distended, non-tender to palpation, no peritoneal signs  Skin: Warm, dry, multiple minor bruises and skin tears consistent with age and Eliquis use  Musculoskeletal: Right lower extremity with chronic deformity of the right foot.  Skin overlying the foot is normal-appearing.  Right knee " is unaffected.  Below the knee there is a circumferential area of redness and bruising that is nonblanching, tender to palpation without crepitus, soft compartments, no pain out of proportion to exam.  Minimal pain with flexion and extension of the right knee and right ankle.  Neurologic: Alert, oriented, normal speech, normal motor function, sensory function intact in the right lower extremity  Psychiatric: Normal and appropriate mood and affect    Medical records reviewed for continuity of care.  Records reviewed from referring facility.  Patient presented to [facility] for right leg swelling.  Noted to have atrial fibrillation on EKG.  Enlarged cardiac silhouette noted on chest x-ray.  On laboratory evaluation creatinine is abnormal at 2.5 to, potassium normal at 4.6.  ProBNP is 34,000.  Troponin is 0.089.  Lactic acid elevated at 3.6.  White blood count 4.63, hemoglobin 11.3, platelet count 60.  INR 1.89.  Chest x-ray demonstrates cardiac enlargement.  Hiatal hernia.  Degenerative changes of the bony skeleton.  Otherwise no acute cardiopulmonary process evident.  Patient transferred to [facility] for cardiology service is not available at outlying facility.  Lower extremity ultrasound was canceled.    EKG: Rate 73, irregularly irregular rhythm, no ST elevation or depression, T-wave inversions present in leads II and III, T-wave flattening noted in V4 through V6, normal voltage    Labs:  Labs Reviewed   CBC WITH DIFFERENTIAL - Abnormal; Notable for the following:        Result Value    WBC 3.2 (*)     RBC 4.17 (*)     Hemoglobin 10.7 (*)     Hematocrit 33.7 (*)     MCV 80.8 (*)     MCH 25.7 (*)     MCHC 31.8 (*)     RDW 51.1 (*)     Platelet Count 45 (*)     Neutrophils-Polys 73.40 (*)     Lymphocytes 4.40 (*)     Lymphs (Absolute) 0.14 (*)     All other components within normal limits   COMP METABOLIC PANEL - Abnormal; Notable for the following:     Sodium 133 (*)     Bun 58 (*)      Creatinine 2.25 (*)     Calcium 8.0 (*)     Total Protein 5.5 (*)     All other components within normal limits   ESTIMATED GFR - Abnormal; Notable for the following:     GFR If  35 (*)     GFR If Non  29 (*)     All other components within normal limits   BTYPE NATRIURETIC PEPTIDE - Abnormal; Notable for the following:     B Natriuretic Peptide 1808 (*)     All other components within normal limits    Narrative:     Indicate which anticoagulants the patient is on:->NONE   TROPONIN - Abnormal; Notable for the following:     Troponin I 0.18 (*)     All other components within normal limits    Narrative:     Indicate which anticoagulants the patient is on:->NONE   PROTHROMBIN TIME - Abnormal; Notable for the following:     PT 40.0 (*)     INR 4.16 (*)     All other components within normal limits    Narrative:     Indicate which anticoagulants the patient is on:->NONE   WESTERGREN SED RATE - Abnormal; Notable for the following:     Sed Rate Westergren 33 (*)     All other components within normal limits    Narrative:     Indicate which anticoagulants the patient is on:->NONE   DIFFERENTIAL MANUAL - Abnormal; Notable for the following:     Bands-Stabs 19.50 (*)     All other components within normal limits   CRP QUANTITIVE (NON-CARDIAC) - Abnormal; Notable for the following:     Stat C-Reactive Protein 30.72 (*)     All other components within normal limits   URINALYSIS - Abnormal; Notable for the following:     Protein 300 (*)     All other components within normal limits    Narrative:     If not done within the last 24 hours   RETICULOCYTES COUNT - Abnormal; Notable for the following:     Imm. Reticulocyte Fraction 7.7 (*)     Retic Hgb Equivalent 25.5 (*)     All other components within normal limits   APTT - Abnormal; Notable for the following:     APTT 41.4 (*)     All other components within normal limits    Narrative:     If not done within the last 4 hours  Indicate which  "anticoagulants the patient is on:->NONE  Fasting   COMP METABOLIC PANEL - Abnormal; Notable for the following:     Sodium 134 (*)     Glucose 114 (*)     Bun 63 (*)     Creatinine 2.18 (*)     Calcium 8.2 (*)     Albumin 2.9 (*)     Total Protein 5.5 (*)     All other components within normal limits    Narrative:     If not done within the last 4 hours  Indicate which anticoagulants the patient is on:->NONE  Fasting   LIPID PROFILE - Abnormal; Notable for the following:     Cholesterol,Tot 80 (*)     Triglycerides 270 (*)     HDL 12 (*)     All other components within normal limits    Narrative:     If not done within the last 4 hours  Indicate which anticoagulants the patient is on:->NONE  Fasting   PROTHROMBIN TIME - Abnormal; Notable for the following:     PT 44.2 (*)     INR 4.72 (*)     All other components within normal limits    Narrative:     If not done within the last 4 hours  Indicate which anticoagulants the patient is on:->NONE  Fasting   IRON/TOTAL IRON BIND - Abnormal; Notable for the following:     Iron <10 (*)     All other components within normal limits   URINE MICROSCOPIC (W/UA) - Abnormal; Notable for the following:     WBC 0-2 (*)     RBC 5-10 (*)     Hyaline Cast 11-20 (*)     All other components within normal limits    Narrative:     If not done within the last 24 hours   ESTIMATED GFR - Abnormal; Notable for the following:     GFR If  36 (*)     GFR If Non  30 (*)     All other components within normal limits    Narrative:     If not done within the last 4 hours  Indicate which anticoagulants the patient is on:->NONE  Fasting   LACTIC ACID    Narrative:     Indicate which anticoagulants the patient is on:->NONE   BLOOD CULTURE    Narrative:     Per Hospital Policy: Only change Specimen Src: to \"Line\" if  specified by physician order.   BLOOD CULTURE    Narrative:     Per Hospital Policy: Only change Specimen Src: to \"Line\" if  specified by physician order. " "  PERIPHERAL SMEAR REVIEW   PLATELET ESTIMATE   MORPHOLOGY   IMMATURE PLT FRACTION   BLOOD CULTURE    Narrative:     From different peripheral sites, if not done within the last  24 hours (Per Hospital Policy: Only change specimen source to  \"Line\" if specified by physician order)   FERRITIN   VITAMIN B12   FOLATE   CBC WITH DIFFERENTIAL    Narrative:     If not done within the last 4 hours  Indicate which anticoagulants the patient is on:->NONE  Fasting   HEMOGLOBIN A1C    Narrative:     If not done within the last 4 hours  Indicate which anticoagulants the patient is on:->NONE  Fasting   TSH    Narrative:     If not done within the last 4 hours  Indicate which anticoagulants the patient is on:->NONE  Fasting   LACTIC ACID   TROPONIN   BLOOD CULTURE   CULTURE RESPIRATORY W/ GRM STN   URINE SODIUM RANDOM   URINE POTASSIUM RANDOM   URINE CHLORIDE RANDOM   URINE CREATININE RANDOM   URINE PROTEIN   LACTIC ACID   LACTIC ACID       Radiology:  LE VENOUS DUPLEX (Specify in Comments Left, Right Or Bilateral)   Final Result      CT-EXTREMITY, LOWER W/O RIGHT   Final Result         1. Cellulitis of the and lower leg extending into the foot. No drainable soft tissue fluid collection. No radiographic evidence of tibial/fibular osteomyelitis.   2. Amputation of the second toe with osseous destruction of the second phalangeal remnant. This may be postoperative or represent osteomyelitis. Correlate clinically for wounds at this level.   3. Charcot arthropathy. No acute fracture.   4. Semiconstrained total knee arthroplasty is well seated.   5. Severe osteopenia with numerous lytic lucencies throughout the foot, likely related to osteopenia. Multiple myeloma can also have this appearance, but is considered less likely.      OUTSIDE IMAGES-DX CHEST   Final Result      US-RENAL    (Results Pending)          ED Medications Administered:  Medications   NS infusion 2,586 mL (not administered)   NS (BOLUS) infusion 500 mL (not " administered)   ampicillin/sulbactam (UNASYN) 3 g in  mL IVPB (0 g Intravenous Stopped 8/16/18 0027)   MD ALERT... vancomycin per pharmacy protocol 1 Each (not administered)   senna-docusate (PERICOLACE or SENOKOT S) 8.6-50 MG per tablet 2 Tab (2 Tabs Oral Refused 8/15/18 2300)     And   polyethylene glycol/lytes (MIRALAX) PACKET 1 Packet (not administered)     And   magnesium hydroxide (MILK OF MAGNESIA) suspension 30 mL (not administered)     And   bisacodyl (DULCOLAX) suppository 10 mg (not administered)   NS infusion ( Intravenous New Bag 8/15/18 2358)   ondansetron (ZOFRAN) syringe/vial injection 4 mg (not administered)   ondansetron (ZOFRAN ODT) dispertab 4 mg (not administered)   Pharmacy Consult Request ...Pain Management Review (not administered)     And   oxyCODONE immediate-release (ROXICODONE) tablet 5 mg (not administered)     And   oxyCODONE immediate release (ROXICODONE) tablet 10 mg (not administered)     And   HYDROmorphone (DILAUDID) injection 0.5 mg (not administered)   apixaban (ELIQUIS) tablet 5 mg (not administered)   atorvastatin (LIPITOR) tablet 20 mg (not administered)   carvedilol (COREG) tablet 25 mg (not administered)   febuxostat (ULORIC) tablet 80 mg (not administered)   levothyroxine (SYNTHROID) tablet 100 mcg (not administered)   furosemide (LASIX) injection 20 mg (20 mg Intravenous Given 8/16/18 0152)   omeprazole (PRILOSEC) capsule 20 mg (not administered)   piperacillin-tazobactam (ZOSYN) 3.375 g in  mL IVPB (0 g Intravenous Stopped 8/15/18 2118)   clindamycin (CLEOCIN) IVPB premix 900 mg (0 mg Intravenous Stopped 8/15/18 2254)   vancomycin 2,200 mg in  mL IVPB (0 mg Intravenous Stopped 8/16/18 0209)   oxyCODONE-acetaminophen (PERCOCET) 5-325 MG per tablet 1 Tab (1 Tab Oral Given 8/15/18 2048)       Differential diagnosis:  Necrotizing fasciitis, osteomyelitis, cellulitis, vasculitis, DVT    MDM:  History and physical exam as documented above.  Patient was  transferred from [facility] for cardiology services.  At [facility] had a markedly elevated BNP as well as enlarged heart on x-ray.  At this time he reports no chest pain, no shortness of breath.  I did consider pulmonary embolism, he is hemodynamically stable and on Eliquis, due to renal insufficiency I did not obtain a CTA of the chest.  If he did have a pulmonary embolism, treatment would be the same at this time.    At this time, he does have concerning skin changes.  He is afebrile with normal white blood count at [facility], no pain out of proportion to exam.  I did initiate antibiotics to cover necrotizing fasciitis despite a low suspicion while further workup is pending.  Lactic acid is elevated, I do have concern for sepsis however this patient does have a significant history for heart failure, do not believe 30 mL/kg bolus of fluid is safe at this time.     On laboratory evaluation White blood count is low at 3.2.  Hemoglobin 10.7.  20% bands resulted.  Creatinine is 2.25.  Blood glucose is normal.  Sodium is low at 133.    Lower extremity ultrasound is negative for DVT.  CT without contrast obtained due to renal insufficiency.  Cellulitis of the lower leg is noted, no drainable soft tissue fluid collection noted.  No radiographic evidence of tibial/fibular osteomyelitis.  No subcutaneous gas is visualized.    On my reassessment, patient states that his pain is improving.  Wound edges were marked on arrival to the emergency department, there is been no extension of the wound or skin changes beyond marked edges.  At this time, I do not believe emergent surgical consultation is necessary.  He remained hemodynamically stable.  Plan at this time is for admission to hospitalist service for continued antibiotics and further imaging if indicated.    Case discussed with Dr. Em who kindly agrees to admit the patient.    Disposition:  Admit to hospitalist in guarded condition.    Final  Impression:  1. Cellulitis of right leg    2. Permanent atrial fibrillation (HCC)    3. History of osteomyelitis        Electronically signed by: Stephanie Patrick, 8/16/2018 4:08 AM

## 2018-08-16 NOTE — ED NOTES
Griselda from Lab called with critical result of band neutrophil 19.5% at 2019. Critical lab result read back to Griselda.   Dr. Patrick notified of critical lab result at 2019.  Critical lab result read back by Dr. Patrick.

## 2018-08-16 NOTE — PROGRESS NOTES
Bedside report received. Patient resting comfortably in bed, no complaints at this time. Call light within reach, family at bedside.

## 2018-08-16 NOTE — PROGRESS NOTES
Arvind from Lab called with critical result of WBC 2.0 and PLT 41 at 0522. Critical lab result read back to Arvind.   Dr. Somers notified of critical lab result at 0527.  Critical lab result read back by Dr. Somers.    Ordered to stop sona.

## 2018-08-16 NOTE — ED TRIAGE NOTES
Patient came in via EMS as a transfer. Patient has swelling and redness to right lower extremity  Since last night. Patient states he woke up in middle of night with symptoms. He has hx of osteomyelitis. 2nd right toe missing from gout complications.

## 2018-08-16 NOTE — PROGRESS NOTES
2 NURSE SKIN CHECK-    RLE CELLULITIS- REDNESS AND SWELLING, MARKED WITH MARKER IN ER  R KNEE- SKIN TEAR  LLE- SKIN TEAR   R ELBOW- SKIN TEAR  L FOREARM SKIN TEAR    GENERALIZED BRUISING AND SCABS TO ARMS AND LEGS.     SACRUM RED BUT BLANCHING.    MEPILEX LITE PLACED ON ALL OPEN WOUNDS.    PHOTOS IN PT MEDIA    WOUND CONSULT PLACED.

## 2018-08-16 NOTE — H&P
Hospital Medicine History & Physical Note    Date of Service  8/15/2018    Primary Care Physician  No primary care provider on file.    Consultants  none    Code Status  full    Chief Complaint  Lower extremity swelling pain     History of Presenting Illness  68 y.o. male who presented 8/15/2018 with right lower leg pain and swelling.  Throbbing pain localized to the right shin worsening severe pain.  Now circumferential swelling and erythema.  Very tender to the touch.  Has had multiple procedures of osteomyelitis of the right foot.  No alleviating or exacerbating factors.  He does have fever chills.  He does have history of heart failure COPD anticoagulated on Eliquis for A. fib he does have a history of gout.  No alleviating or exacerbating factors to her symptoms.    Review of Systems  Review of Systems   Constitutional: Positive for chills, diaphoresis and fever.   HENT: Negative for congestion, hearing loss and tinnitus.    Eyes: Negative for blurred vision, double vision and discharge.   Respiratory: Negative for cough, hemoptysis and shortness of breath.    Cardiovascular: Positive for leg swelling. Negative for chest pain and palpitations.   Gastrointestinal: Negative for abdominal pain, heartburn, nausea and vomiting.   Genitourinary: Negative for dysuria and flank pain.   Musculoskeletal: Positive for joint pain. Negative for myalgias.   Skin: Negative for rash.   Neurological: Negative for dizziness, sensory change, speech change, focal weakness and weakness.   Endo/Heme/Allergies: Negative for environmental allergies. Does not bruise/bleed easily.   Psychiatric/Behavioral: Negative for depression, hallucinations and substance abuse.       Past Medical History   has a past medical history of Atrial fibrillation (HCC); CHF (congestive heart failure) (HCC); Hypothyroidism; and Renal insufficiency.    Surgical History  Reviewed an dnoncontributory  Family History  Reviewed and noncontributory    Social  History   reports that he has never smoked. He has never used smokeless tobacco. He reports that he does not drink alcohol or use drugs.    Allergies  Allergies   Allergen Reactions   • Allopurinol      Jan wes's reaction     • Ertapenem      Hallucinations     • Prevacid [Fd&C Red #40-Lansoprazole]      Diarrhea         Medications  None       Physical Exam  Blood Pressure : 114/77   Temperature: 36.6 °C (97.8 °F)   Pulse: 72   Respiration: 12   Pulse Oximetry: 95 %     Physical Exam   Constitutional: He is oriented to person, place, and time. He appears well-developed and well-nourished.   HENT:   Head: Normocephalic and atraumatic.   Eyes: Pupils are equal, round, and reactive to light. Conjunctivae and EOM are normal.   Neck: Normal range of motion. Neck supple. No JVD present.   Cardiovascular: Normal rate, regular rhythm, normal heart sounds and intact distal pulses.    No murmur heard.  Pulmonary/Chest: Effort normal and breath sounds normal. No respiratory distress.   Abdominal: Soft. Bowel sounds are normal. He exhibits no distension. There is no tenderness.   Musculoskeletal: Normal range of motion. He exhibits edema.   Pitting bilateral   Neurological: He is alert and oriented to person, place, and time. No cranial nerve deficit. He exhibits normal muscle tone.   Skin: Skin is warm and dry. There is erythema.   Right lower extremity erythema and swelling, TTP. Area marked with pen. Charcot foot   Psychiatric: He has a normal mood and affect. His behavior is normal. Judgment and thought content normal.   Nursing note and vitals reviewed.      Laboratory:  Recent Labs      08/15/18   1835   WBC  3.2*   RBC  4.17*   HEMOGLOBIN  10.7*   HEMATOCRIT  33.7*   MCV  80.8*   MCH  25.7*   MCHC  31.8*   RDW  51.1*   PLATELETCT  45*   MPV  11.6     Recent Labs      08/15/18   1835   SODIUM  133*   POTASSIUM  4.7   CHLORIDE  102   CO2  22   GLUCOSE  79   BUN  58*   CREATININE  2.25*   CALCIUM  8.0*     Recent  Labs      08/15/18   1835   ALTSGPT  22   ASTSGOT  30   ALKPHOSPHAT  57   TBILIRUBIN  0.9   GLUCOSE  79     Recent Labs      08/15/18   1835   INR  4.16*             Lab Results   Component Value Date    TROPONINI 0.18 (H) 08/15/2018       Urinalysis:    No results found     Imaging:  LE VENOUS DUPLEX (Specify in Comments Left, Right Or Bilateral)   Final Result      CT-EXTREMITY, LOWER W/O RIGHT   Final Result         1. Cellulitis of the and lower leg extending into the foot. No drainable soft tissue fluid collection. No radiographic evidence of tibial/fibular osteomyelitis.   2. Amputation of the second toe with osseous destruction of the second phalangeal remnant. This may be postoperative or represent osteomyelitis. Correlate clinically for wounds at this level.   3. Charcot arthropathy. No acute fracture.   4. Semiconstrained total knee arthroplasty is well seated.   5. Severe osteopenia with numerous lytic lucencies throughout the foot, likely related to osteopenia. Multiple myeloma can also have this appearance, but is considered less likely.      OUTSIDE IMAGES-DX CHEST   Final Result      US-RENAL    (Results Pending)         Assessment/Plan:  I anticipate this patient will require at least two midnights for appropriate medical management, necessitating inpatient admission.    * Sepsis due to cellulitis ([facility])   Assessment & Plan    This is sepsis (without associated acute organ dysfunction).   Left foot cellulitis  Needs IV antibiotics  Trend lactic   follow cultures  De-escalate therapy as clinically appropriate        CHF (congestive heart failure) ([facility])   Assessment & Plan    Unknown baseline  Continue home medications   Does have elevated BNP and volume overload on exam will give extra doses of Lasix            JOSY (acute kidney injury) ([facility])   Assessment & Plan    Unsure baseline? Need reccords from [place]   Hold losartan  check urine lites  Check renal ultrasound  Continue to monitor renal  function        Atrial fibrillation (HCC)   Assessment & Plan    Chronic  On eliquis        Elevated troponin   Assessment & Plan    Seems to be demand ischemia from sepsis and possible CHF continue to monitor consider cardiology consultation in the morning also check echocardiogram          Anemia   Assessment & Plan    Lab workup        HLD (hyperlipidemia)   Assessment & Plan    Check lipid panel resume statin        Gout   Assessment & Plan    Resume Uloric        GERD (gastroesophageal reflux disease)   Assessment & Plan    Resume omeprazole        HTN (hypertension)   Assessment & Plan    Resume home antihypertensives            VTE prophylaxis: eliquis

## 2018-08-17 PROBLEM — N40.1 BENIGN PROSTATIC HYPERPLASIA WITH LOWER URINARY TRACT SYMPTOMS: Status: ACTIVE | Noted: 2018-08-17

## 2018-08-17 LAB
ANION GAP SERPL CALC-SCNC: 11 MMOL/L (ref 0–11.9)
ANISOCYTOSIS BLD QL SMEAR: ABNORMAL
BASOPHILS # BLD AUTO: 0 % (ref 0–1.8)
BASOPHILS # BLD: 0 K/UL (ref 0–0.12)
BNP SERPL-MCNC: 824 PG/ML (ref 0–100)
BUN SERPL-MCNC: 57 MG/DL (ref 8–22)
BURR CELLS BLD QL SMEAR: NORMAL
CALCIUM SERPL-MCNC: 8.2 MG/DL (ref 8.5–10.5)
CHLORIDE SERPL-SCNC: 104 MMOL/L (ref 96–112)
CO2 SERPL-SCNC: 21 MMOL/L (ref 20–33)
CREAT SERPL-MCNC: 2.02 MG/DL (ref 0.5–1.4)
DACRYOCYTES BLD QL SMEAR: NORMAL
EOSINOPHIL # BLD AUTO: 0 K/UL (ref 0–0.51)
EOSINOPHIL NFR BLD: 0 % (ref 0–6.9)
ERYTHROCYTE [DISTWIDTH] IN BLOOD BY AUTOMATED COUNT: 51.9 FL (ref 35.9–50)
GLUCOSE SERPL-MCNC: 116 MG/DL (ref 65–99)
HCT VFR BLD AUTO: 29.7 % (ref 42–52)
HGB BLD-MCNC: 9.4 G/DL (ref 14–18)
LV EJECT FRACT  99904: 50
LV EJECT FRACT MOD 2C 99903: 46.58
LV EJECT FRACT MOD 4C 99902: 52.57
LV EJECT FRACT MOD BP 99901: 51.34
LYMPHOCYTES # BLD AUTO: 0.1 K/UL (ref 1–4.8)
LYMPHOCYTES NFR BLD: 4.4 % (ref 22–41)
MANUAL DIFF BLD: NORMAL
MCH RBC QN AUTO: 25.3 PG (ref 27–33)
MCHC RBC AUTO-ENTMCNC: 31.6 G/DL (ref 33.7–35.3)
MCV RBC AUTO: 80.1 FL (ref 81.4–97.8)
METAMYELOCYTES NFR BLD MANUAL: 0.9 %
MICROCYTES BLD QL SMEAR: ABNORMAL
MONOCYTES # BLD AUTO: 0.08 K/UL (ref 0–0.85)
MONOCYTES NFR BLD AUTO: 3.5 % (ref 0–13.4)
MORPHOLOGY BLD-IMP: NORMAL
NEUTROPHILS # BLD AUTO: 2.01 K/UL (ref 1.82–7.42)
NEUTROPHILS NFR BLD: 91.2 % (ref 44–72)
NRBC # BLD AUTO: 0 K/UL
NRBC BLD-RTO: 0 /100 WBC
OVALOCYTES BLD QL SMEAR: NORMAL
PLATELET # BLD AUTO: 35 K/UL (ref 164–446)
PLATELET BLD QL SMEAR: NORMAL
PLATELETS.RETICULATED NFR BLD AUTO: 4.8 K/UL (ref 0.6–13.1)
POIKILOCYTOSIS BLD QL SMEAR: NORMAL
POTASSIUM SERPL-SCNC: 4.1 MMOL/L (ref 3.6–5.5)
RBC # BLD AUTO: 3.71 M/UL (ref 4.7–6.1)
RBC BLD AUTO: PRESENT
SCHISTOCYTES BLD QL SMEAR: NORMAL
SODIUM SERPL-SCNC: 136 MMOL/L (ref 135–145)
TROPONIN I SERPL-MCNC: 0.18 NG/ML (ref 0–0.04)
WBC # BLD AUTO: 2.2 K/UL (ref 4.8–10.8)

## 2018-08-17 PROCEDURE — 770020 HCHG ROOM/CARE - TELE (206)

## 2018-08-17 PROCEDURE — 36415 COLL VENOUS BLD VENIPUNCTURE: CPT

## 2018-08-17 PROCEDURE — 80048 BASIC METABOLIC PNL TOTAL CA: CPT

## 2018-08-17 PROCEDURE — 84484 ASSAY OF TROPONIN QUANT: CPT

## 2018-08-17 PROCEDURE — A9270 NON-COVERED ITEM OR SERVICE: HCPCS | Performed by: HOSPITALIST

## 2018-08-17 PROCEDURE — 85027 COMPLETE CBC AUTOMATED: CPT

## 2018-08-17 PROCEDURE — 700111 HCHG RX REV CODE 636 W/ 250 OVERRIDE (IP): Performed by: HOSPITALIST

## 2018-08-17 PROCEDURE — 700102 HCHG RX REV CODE 250 W/ 637 OVERRIDE(OP): Performed by: HOSPITALIST

## 2018-08-17 PROCEDURE — 93306 TTE W/DOPPLER COMPLETE: CPT

## 2018-08-17 PROCEDURE — 93306 TTE W/DOPPLER COMPLETE: CPT | Mod: 26 | Performed by: INTERNAL MEDICINE

## 2018-08-17 PROCEDURE — 85055 RETICULATED PLATELET ASSAY: CPT

## 2018-08-17 PROCEDURE — 83880 ASSAY OF NATRIURETIC PEPTIDE: CPT

## 2018-08-17 PROCEDURE — 700105 HCHG RX REV CODE 258

## 2018-08-17 PROCEDURE — 85007 BL SMEAR W/DIFF WBC COUNT: CPT

## 2018-08-17 PROCEDURE — 99233 SBSQ HOSP IP/OBS HIGH 50: CPT | Performed by: HOSPITALIST

## 2018-08-17 PROCEDURE — 700105 HCHG RX REV CODE 258: Performed by: HOSPITALIST

## 2018-08-17 RX ORDER — SODIUM CHLORIDE 9 MG/ML
INJECTION, SOLUTION INTRAVENOUS
Status: COMPLETED
Start: 2018-08-17 | End: 2018-08-17

## 2018-08-17 RX ORDER — TAMSULOSIN HYDROCHLORIDE 0.4 MG/1
0.4 CAPSULE ORAL
Status: DISCONTINUED | OUTPATIENT
Start: 2018-08-17 | End: 2018-08-20 | Stop reason: HOSPADM

## 2018-08-17 RX ADMIN — FUROSEMIDE 20 MG: 10 INJECTION, SOLUTION INTRAMUSCULAR; INTRAVENOUS at 17:06

## 2018-08-17 RX ADMIN — OXYCODONE HYDROCHLORIDE 10 MG: 10 TABLET ORAL at 13:56

## 2018-08-17 RX ADMIN — CARVEDILOL 25 MG: 25 TABLET, FILM COATED ORAL at 17:06

## 2018-08-17 RX ADMIN — AMPICILLIN AND SULBACTAM 3 G: 2; 1 INJECTION, POWDER, FOR SOLUTION INTRAVENOUS at 12:55

## 2018-08-17 RX ADMIN — AMPICILLIN AND SULBACTAM 3 G: 2; 1 INJECTION, POWDER, FOR SOLUTION INTRAVENOUS at 18:34

## 2018-08-17 RX ADMIN — OMEPRAZOLE 20 MG: 20 CAPSULE, DELAYED RELEASE ORAL at 05:21

## 2018-08-17 RX ADMIN — LEVOTHYROXINE SODIUM 100 MCG: 100 TABLET ORAL at 05:21

## 2018-08-17 RX ADMIN — AMPICILLIN AND SULBACTAM 3 G: 2; 1 INJECTION, POWDER, FOR SOLUTION INTRAVENOUS at 05:22

## 2018-08-17 RX ADMIN — SODIUM CHLORIDE: 9 INJECTION, SOLUTION INTRAVENOUS at 13:00

## 2018-08-17 RX ADMIN — FEBUXOSTAT 80 MG: 40 TABLET ORAL at 05:22

## 2018-08-17 RX ADMIN — ATORVASTATIN CALCIUM 20 MG: 20 TABLET, FILM COATED ORAL at 05:22

## 2018-08-17 RX ADMIN — TAMSULOSIN HYDROCHLORIDE 0.4 MG: 0.4 CAPSULE ORAL at 17:06

## 2018-08-17 RX ADMIN — FUROSEMIDE 20 MG: 10 INJECTION, SOLUTION INTRAMUSCULAR; INTRAVENOUS at 05:21

## 2018-08-17 RX ADMIN — OXYCODONE HYDROCHLORIDE 10 MG: 10 TABLET ORAL at 03:42

## 2018-08-17 RX ADMIN — CARVEDILOL 25 MG: 25 TABLET, FILM COATED ORAL at 08:40

## 2018-08-17 ASSESSMENT — PAIN SCALES - GENERAL
PAINLEVEL_OUTOF10: 6
PAINLEVEL_OUTOF10: 3
PAINLEVEL_OUTOF10: 0
PAINLEVEL_OUTOF10: 0

## 2018-08-17 ASSESSMENT — ENCOUNTER SYMPTOMS
PSYCHIATRIC NEGATIVE: 1
MUSCULOSKELETAL NEGATIVE: 1
NEUROLOGICAL NEGATIVE: 1
EYES NEGATIVE: 1
GASTROINTESTINAL NEGATIVE: 1
BRUISES/BLEEDS EASILY: 1

## 2018-08-17 NOTE — PROGRESS NOTES
Bedside report received, patient resting comfortably in bed. Plt count 35, WBC count 2.2 - MD aware, patient has known history of thrombocytopenia.     Per monitor room - patient had 12 beat run Vtach at 0730 - MD notified. Will continue to monitor.

## 2018-08-17 NOTE — HEART FAILURE PROGRAM
Cardiovascular Nurse Navigator () Advanced Heart Failure Program Inpatient Progress Note:    This patient has been diagnosed with heart failure. Prinicpal problem is cellulitis of leg, right.    This patient has not been seen inpatient or out by [facility] inpatient or out. He lives in [facility] per demographics and Care Everywhere does indicate that patient last saw his cardiologist, Masood Hester at [facility] on 6/5/18 with a HF dx. Patient's PCP appears to be Shahnaz Chan of [facility]    Notes not indicating if patient is visiting the [facility] or if he has relocated here.    Have asked HS's to arrange f/u with patient's physicians in [place] with possible discharge on Monday.    Thank you and please call with questions, Jayne

## 2018-08-17 NOTE — PROGRESS NOTES
[facility] Medicine Daily Progress Note    Date of Service  8/17/2018    Chief Complaint  68 y.o. male admitted 8/15/2018 with right leg swelling      Interval Problem Update   low platelets  Is worse 35    Patient has known hx of pancytopenia.. Wbc and hb within his known low ranges    Afebrile    He was found to have enlarged prostate    Echo pending    Right leg redness and swelling is less    Known chronic kidney disease    Cr is 2.02    Last bnp elevated at 824    Consultants/Specialty  none    Disposition  home    Review of Systems  Review of Systems   Constitutional: Positive for malaise/fatigue.   HENT: Negative.    Eyes: Negative.    Cardiovascular: Positive for leg swelling.   Gastrointestinal: Negative.    Genitourinary: Negative.    Musculoskeletal: Negative.    Skin: Negative.    Neurological: Negative.    Endo/Heme/Allergies: Bruises/bleeds easily.   Psychiatric/Behavioral: Negative.    All other systems reviewed and are negative.       Physical Exam  Blood Pressure : 118/75   Temperature: 36.5 °C (97.7 °F)   Pulse: 82   Respiration: 18   Pulse Oximetry: 92 %     Physical Exam   Constitutional: He is oriented to person, place, and time. No distress.   Eyes: Left eye exhibits no discharge. No scleral icterus.   Neck: JVD present.   Cardiovascular: Normal rate.  An irregularly irregular rhythm present. Exam reveals no gallop and no friction rub.    No murmur heard.  Pulmonary/Chest: Effort normal. No respiratory distress. He has no wheezes. He has no rales.   Abdominal: Soft. He exhibits distension. There is no tenderness. There is no rebound.   Musculoskeletal: He exhibits edema (right leg swelling).   Neurological: He is alert and oriented to person, place, and time. No cranial nerve deficit. Coordination normal.   Skin: He is not diaphoretic. There is erythema (right lower leg).       Fluids    Intake/Output Summary (Last 24 hours) at 08/17/18 1558  Last data filed at 08/17/18 0600   Gross per 24 hour    Intake              720 ml   Output             1550 ml   Net             -830 ml       Laboratory  Recent Labs      08/15/18   1835  08/16/18   0244  08/17/18   0322   WBC  3.2*  2.0*  2.2*   RBC  4.17*  4.03*  3.71*   HEMOGLOBIN  10.7*  10.1*  9.4*   HEMATOCRIT  33.7*  32.9*  29.7*   MCV  80.8*  81.6  80.1*   MCH  25.7*  25.1*  25.3*   MCHC  31.8*  30.7*  31.6*   RDW  51.1*  52.9*  51.9*   PLATELETCT  45*  41*  35*   MPV  11.6  10.5   --      Recent Labs      08/15/18   1835  08/16/18   0243  08/17/18   0322   SODIUM  133*  134*  136   POTASSIUM  4.7  4.5  4.1   CHLORIDE  102  102  104   CO2  22  23  21   GLUCOSE  79  114*  116*   BUN  58*  63*  57*   CREATININE  2.25*  2.18*  2.02*   CALCIUM  8.0*  8.2*  8.2*     Recent Labs      08/15/18   1835  08/16/18   0244   APTT   --   41.4*   INR  4.16*  4.72*     Recent Labs      08/15/18   1835  08/16/18   1731  08/17/18   0322   BNPBTYPENAT  1808*  867*  824*     Recent Labs      08/16/18   0243   TRIGLYCERIDE  270*   HDL  12*   LDL  14       Imaging  US-RENAL   Final Result      1.  Unremarkable kidneys. No hydronephrosis.   2.  Prostatomegaly.      LE VENOUS DUPLEX (Specify in Comments Left, Right Or Bilateral)   Final Result      CT-EXTREMITY, LOWER W/O RIGHT   Final Result         1. Cellulitis of the and lower leg extending into the foot. No drainable soft tissue fluid collection. No radiographic evidence of tibial/fibular osteomyelitis.   2. Amputation of the second toe with osseous destruction of the second phalangeal remnant. This may be postoperative or represent osteomyelitis. Correlate clinically for wounds at this level.   3. Charcot arthropathy. No acute fracture.   4. Semiconstrained total knee arthroplasty is well seated.   5. Severe osteopenia with numerous lytic lucencies throughout the foot, likely related to osteopenia. Multiple myeloma can also have this appearance, but is considered less likely.      OUTSIDE IMAGES-DX CHEST   Final Result       ECHOCARDIOGRAM COMP W/O CONT    (Results Pending)        Assessment/Plan  * Cellulitis of leg, right- (present on admission)   Assessment & Plan      Right lower leg cellulitis  IV antibiotics iv unasyn     follow cultures          acute CHF (congestive heart failure) (HCC)- (present on admission)   Assessment & Plan    Unknown baseline  Appears acute  Check echo    Diuresis with iv lasix 20mg q12    Cardiac diet              Benign prostatic hyperplasia with lower urinary tract symptoms- (present on admission)   Assessment & Plan    Started flomax 0.4 po qhs on 8/17        Pancytopenia (HCC)- (present on admission)   Assessment & Plan    Known issue    Patient refuses bone marrow biopsy at this time        Thrombocytopenia (HCC)- (present on admission)   Assessment & Plan    Hold eliquis for now    Follow cbc        CKD (chronic kidney disease) stage 3, GFR 30-59 ml/min- (present on admission)   Assessment & Plan    Avoid nsaids and nephrotoxins    diuresis    Check am bmp        Atrial fibrillation (HCC)- (present on admission)   Assessment & Plan    Chronic  On eliquis( on hold until we see platelet count improve)        Elevated troponin- (present on admission)   Assessment & Plan    Seems to be demand ischemia from leg infection and possible CHF    Follow troponins    Check echo              Anemia- (present on admission)   Assessment & Plan    chronic        HLD (hyperlipidemia)- (present on admission)   Assessment & Plan    resume statin        Gout- (present on admission)   Assessment & Plan    Resume Uloric        GERD (gastroesophageal reflux disease)- (present on admission)   Assessment & Plan    Resume omeprazole        HTN (hypertension)- (present on admission)   Assessment & Plan    Cont  Po coreg        check am cbc, bmp, bnp

## 2018-08-17 NOTE — WOUND TEAM
In to see patient for RLE cellulitis and multiple skin tears.  Patient has severe charcot foot to bilateral feet, right worse then left.  Not diabetic, previous toe amputations due to deformities.  On blood thinners with bruising and dry/intact crust throughout upper and lower bilateral extremities.      Cleansed open yrn to shins and left forearm with NS and gauze and covered with adhesive foam.  Right medial first MTH with abrasion from friction, padded with non-adhesive foam.  Patient has calloused wound to right plantar foot, dopplered pulses (monophasic) offered with pear down protruding callous to foot, however patient refused saying he would rather have it done back at home (patient from [place] and was in [place] on vacation)  Offered to offload area with foam but patient again refused saying he will not be walking much.  Has offloading shoes with him that he was advised to where when out of bed.    RLE still with edema, tube  E applied to assist with swelling.    Applied roll gauze to LLE to protect from further trauma.    No other needs at this time.

## 2018-08-17 NOTE — PROGRESS NOTES
Monitor Summary:    Afib 66-78  1.6 second pause with nonsustained bradycardia to 36  (O) PVC (R) coup  -/.10/-

## 2018-08-17 NOTE — DOCUMENTATION QUERY
"DOCUMENTATION QUERY    PROVIDERS: Please select “Cosign w/ note”to reply to query.    To better represent the severity of illness of your patient, please review the following information and exercise your independent professional judgment in responding to this query.     Sepsis\" is documented in the History and Physical and is no longer being documented in the Progress Notes. Based upon the clinical findings, risk factors, and treatment, please clarify if sepsis can be ruled in/out.    • Sepsis has resolved  • Sepsis has been ruled in  • Sepsis has been ruled out  • Other explanation of clinical findings  • Unable to determine    The medical record reflects the following:   Clinical Findings SIRS criteria: 1/4 met 8/15 (within 6 hours of admit) - WBC's 3.2 (< 4)  8/15 BP Range: 107/62 - 120/74; T: 97.8; P: 64 - 84; R: 9 - 19  8/15 WBC's: 3.2; Lactic Acid: 1.7 - 2.0  8/15 Blood Cultures x 2: Negative  8/15 H&P: \"Sepsis due to cellulitis, JOSY and demand ischemia from sepsis and possible CHF\" is documented.   Treatment IVNS; unasyn; zosyn; vancomycin; lab testing   Risk Factors Age; left foot cellulitis, history of osteomyelitis of right foot   Location within medical record History and Physical, Progress Notes, Lab Results and MAR     Thank you,   Bel Rivas RN  Clinical   [phone]        "

## 2018-08-17 NOTE — PROGRESS NOTES
Patient OOB to bedside commode x2 assist. Wheelchair bound over the last year per patient with occasional use of walker. Mepilex placed to sacrum for prevention - sacrum red/blanchable.

## 2018-08-18 PROBLEM — I50.31 ACUTE DIASTOLIC HEART FAILURE (HCC): Status: ACTIVE | Noted: 2018-08-16

## 2018-08-18 LAB
ANION GAP SERPL CALC-SCNC: 10 MMOL/L (ref 0–11.9)
BASOPHILS # BLD AUTO: 0 % (ref 0–1.8)
BASOPHILS # BLD: 0 K/UL (ref 0–0.12)
BNP SERPL-MCNC: 1744 PG/ML (ref 0–100)
BUN SERPL-MCNC: 49 MG/DL (ref 8–22)
CALCIUM SERPL-MCNC: 8.4 MG/DL (ref 8.5–10.5)
CHLORIDE SERPL-SCNC: 104 MMOL/L (ref 96–112)
CO2 SERPL-SCNC: 23 MMOL/L (ref 20–33)
CREAT SERPL-MCNC: 1.87 MG/DL (ref 0.5–1.4)
EOSINOPHIL # BLD AUTO: 0.02 K/UL (ref 0–0.51)
EOSINOPHIL NFR BLD: 0.6 % (ref 0–6.9)
ERYTHROCYTE [DISTWIDTH] IN BLOOD BY AUTOMATED COUNT: 52.9 FL (ref 35.9–50)
GLUCOSE SERPL-MCNC: 112 MG/DL (ref 65–99)
HCT VFR BLD AUTO: 29.1 % (ref 42–52)
HGB BLD-MCNC: 9.3 G/DL (ref 14–18)
IMM GRANULOCYTES # BLD AUTO: 0.02 K/UL (ref 0–0.11)
IMM GRANULOCYTES NFR BLD AUTO: 0.6 % (ref 0–0.9)
LYMPHOCYTES # BLD AUTO: 0.16 K/UL (ref 1–4.8)
LYMPHOCYTES NFR BLD: 4.6 % (ref 22–41)
MCH RBC QN AUTO: 25.3 PG (ref 27–33)
MCHC RBC AUTO-ENTMCNC: 32 G/DL (ref 33.7–35.3)
MCV RBC AUTO: 79.1 FL (ref 81.4–97.8)
MONOCYTES # BLD AUTO: 0.25 K/UL (ref 0–0.85)
MONOCYTES NFR BLD AUTO: 7.2 % (ref 0–13.4)
NEUTROPHILS # BLD AUTO: 3.02 K/UL (ref 1.82–7.42)
NEUTROPHILS NFR BLD: 87 % (ref 44–72)
NRBC # BLD AUTO: 0 K/UL
NRBC BLD-RTO: 0 /100 WBC
PLATELET # BLD AUTO: 42 K/UL (ref 164–446)
PMV BLD AUTO: 11.5 FL (ref 9–12.9)
POTASSIUM SERPL-SCNC: 4.1 MMOL/L (ref 3.6–5.5)
RBC # BLD AUTO: 3.68 M/UL (ref 4.7–6.1)
SODIUM SERPL-SCNC: 137 MMOL/L (ref 135–145)
WBC # BLD AUTO: 3.5 K/UL (ref 4.8–10.8)

## 2018-08-18 PROCEDURE — 700111 HCHG RX REV CODE 636 W/ 250 OVERRIDE (IP): Performed by: HOSPITALIST

## 2018-08-18 PROCEDURE — 700102 HCHG RX REV CODE 250 W/ 637 OVERRIDE(OP): Performed by: HOSPITALIST

## 2018-08-18 PROCEDURE — A9270 NON-COVERED ITEM OR SERVICE: HCPCS | Performed by: HOSPITALIST

## 2018-08-18 PROCEDURE — 80048 BASIC METABOLIC PNL TOTAL CA: CPT

## 2018-08-18 PROCEDURE — 700105 HCHG RX REV CODE 258: Performed by: HOSPITALIST

## 2018-08-18 PROCEDURE — 99232 SBSQ HOSP IP/OBS MODERATE 35: CPT | Performed by: HOSPITALIST

## 2018-08-18 PROCEDURE — 770020 HCHG ROOM/CARE - TELE (206)

## 2018-08-18 PROCEDURE — 83880 ASSAY OF NATRIURETIC PEPTIDE: CPT

## 2018-08-18 PROCEDURE — 85025 COMPLETE CBC W/AUTO DIFF WBC: CPT

## 2018-08-18 PROCEDURE — 36415 COLL VENOUS BLD VENIPUNCTURE: CPT

## 2018-08-18 RX ORDER — FUROSEMIDE 10 MG/ML
40 INJECTION INTRAMUSCULAR; INTRAVENOUS
Status: DISCONTINUED | OUTPATIENT
Start: 2018-08-18 | End: 2018-08-20 | Stop reason: HOSPADM

## 2018-08-18 RX ORDER — CARVEDILOL 12.5 MG/1
12.5 TABLET ORAL 2 TIMES DAILY WITH MEALS
Status: DISCONTINUED | OUTPATIENT
Start: 2018-08-18 | End: 2018-08-20 | Stop reason: HOSPADM

## 2018-08-18 RX ORDER — FUROSEMIDE 10 MG/ML
20 INJECTION INTRAMUSCULAR; INTRAVENOUS ONCE
Status: COMPLETED | OUTPATIENT
Start: 2018-08-18 | End: 2018-08-18

## 2018-08-18 RX ORDER — FUROSEMIDE 10 MG/ML
40 INJECTION INTRAMUSCULAR; INTRAVENOUS
Status: DISCONTINUED | OUTPATIENT
Start: 2018-08-18 | End: 2018-08-18

## 2018-08-18 RX ORDER — ENALAPRILAT 1.25 MG/ML
1.25 INJECTION INTRAVENOUS EVERY 6 HOURS PRN
Status: DISCONTINUED | OUTPATIENT
Start: 2018-08-18 | End: 2018-08-20 | Stop reason: HOSPADM

## 2018-08-18 RX ADMIN — ATORVASTATIN CALCIUM 20 MG: 20 TABLET, FILM COATED ORAL at 05:21

## 2018-08-18 RX ADMIN — FEBUXOSTAT 80 MG: 40 TABLET ORAL at 05:21

## 2018-08-18 RX ADMIN — LEVOTHYROXINE SODIUM 100 MCG: 100 TABLET ORAL at 05:21

## 2018-08-18 RX ADMIN — FUROSEMIDE 20 MG: 10 INJECTION, SOLUTION INTRAMUSCULAR; INTRAVENOUS at 05:21

## 2018-08-18 RX ADMIN — AMPICILLIN AND SULBACTAM 3 G: 2; 1 INJECTION, POWDER, FOR SOLUTION INTRAVENOUS at 12:22

## 2018-08-18 RX ADMIN — AMPICILLIN AND SULBACTAM 3 G: 2; 1 INJECTION, POWDER, FOR SOLUTION INTRAVENOUS at 00:30

## 2018-08-18 RX ADMIN — CARVEDILOL 25 MG: 25 TABLET, FILM COATED ORAL at 08:15

## 2018-08-18 RX ADMIN — FUROSEMIDE 40 MG: 10 INJECTION, SOLUTION INTRAMUSCULAR; INTRAVENOUS at 17:57

## 2018-08-18 RX ADMIN — OXYCODONE HYDROCHLORIDE 10 MG: 10 TABLET ORAL at 08:18

## 2018-08-18 RX ADMIN — ENALAPRILAT 1.25 MG: 1.25 INJECTION INTRAVENOUS at 17:32

## 2018-08-18 RX ADMIN — AMPICILLIN AND SULBACTAM 3 G: 2; 1 INJECTION, POWDER, FOR SOLUTION INTRAVENOUS at 23:31

## 2018-08-18 RX ADMIN — OXYCODONE HYDROCHLORIDE 10 MG: 10 TABLET ORAL at 03:06

## 2018-08-18 RX ADMIN — AMPICILLIN AND SULBACTAM 3 G: 2; 1 INJECTION, POWDER, FOR SOLUTION INTRAVENOUS at 05:21

## 2018-08-18 RX ADMIN — AMPICILLIN AND SULBACTAM 3 G: 2; 1 INJECTION, POWDER, FOR SOLUTION INTRAVENOUS at 17:37

## 2018-08-18 RX ADMIN — TAMSULOSIN HYDROCHLORIDE 0.4 MG: 0.4 CAPSULE ORAL at 10:07

## 2018-08-18 RX ADMIN — FUROSEMIDE 20 MG: 10 INJECTION, SOLUTION INTRAMUSCULAR; INTRAVENOUS at 10:06

## 2018-08-18 RX ADMIN — OMEPRAZOLE 20 MG: 20 CAPSULE, DELAYED RELEASE ORAL at 05:21

## 2018-08-18 RX ADMIN — CARVEDILOL 12.5 MG: 12.5 TABLET, FILM COATED ORAL at 16:35

## 2018-08-18 ASSESSMENT — PAIN SCALES - GENERAL
PAINLEVEL_OUTOF10: 0
PAINLEVEL_OUTOF10: 4
PAINLEVEL_OUTOF10: 5
PAINLEVEL_OUTOF10: 6
PAINLEVEL_OUTOF10: 3
PAINLEVEL_OUTOF10: 7
PAINLEVEL_OUTOF10: 0

## 2018-08-18 ASSESSMENT — ENCOUNTER SYMPTOMS
MUSCULOSKELETAL NEGATIVE: 1
NEUROLOGICAL NEGATIVE: 1
BRUISES/BLEEDS EASILY: 1
EYES NEGATIVE: 1
PSYCHIATRIC NEGATIVE: 1
GASTROINTESTINAL NEGATIVE: 1

## 2018-08-18 ASSESSMENT — PATIENT HEALTH QUESTIONNAIRE - PHQ9
2. FEELING DOWN, DEPRESSED, IRRITABLE, OR HOPELESS: NOT AT ALL
SUM OF ALL RESPONSES TO PHQ9 QUESTIONS 1 AND 2: 0
1. LITTLE INTEREST OR PLEASURE IN DOING THINGS: NOT AT ALL

## 2018-08-18 NOTE — CARE PLAN
Problem: Safety  Goal: Will remain free from falls    Intervention: Implement fall precautions   08/17/18 2000   OTHER   Environmental Precautions Treaded Slipper Socks on Patient;Personal Belongings, Wastebasket, Call Bell etc. in Easy Reach;Bed in Low Position;Report Given to Other Health Care Providers Regarding Fall Risk;Communication Sign for Patients & Families;Mobility Assessed & Appropriate Sign Placed         Problem: Skin Integrity  Goal: Risk for impaired skin integrity will decrease    Intervention: Implement precautions to protect skin integrity in collaboration with the interdisciplinary team   08/17/18 2100   OTHER   Skin Preventative Measures Pillows in Use to Float Heels;Pillows in Use for Support / Positioning   Bed Types Pressure Redistribution Mattress (Atmosair)   Friction Interventions Draw Sheet / Pad Used for Repositioning   PT / OT Involved in Care Order has been Placed   Activity  Bed   Patient Turns / Repositioning Patient Turns Self from Side to Side   Patient is Receiving Nutrition Oral Intake Adequate   Vitamin Therapy in Use No

## 2018-08-18 NOTE — CARE PLAN
Problem: Safety  Goal: Will remain free from falls  Outcome: PROGRESSING AS EXPECTED  Assessed risk factors for falls.  Patient remains at a high risk.    Problem: Infection  Goal: Will remain free from infection  Assessed signs and symptoms of infection

## 2018-08-18 NOTE — PROGRESS NOTES
Wound care to see patient - replaced dressings with instructions for dressing changes per note. Mepilex placed on sacrum for preventative measures. Waffle cushion placed for preventative measures.     Patient able to stand at bedside with walker and shuffle few steps using foot brace from home.

## 2018-08-18 NOTE — PROGRESS NOTES
Report received at bedside, assumed care of patient. Patient A&O x 4, no signs of distress noted. All LDAs assessed. Discussed POC with patient and all questions answered at this time. Bed in lowest position, upper side rails up. Call light within reach. Personal possessions in reach. Will continue to monitor pt status.

## 2018-08-18 NOTE — PROGRESS NOTES
Monitor Summary:     Afib 60-70  11 beats Юлия Diego MD notified and aware  (O) coup  (O) PVC  (R) Trigeminy    -/.10/-

## 2018-08-18 NOTE — PROGRESS NOTES
Patient blood pressure was 154/107.  Coreg was given before breakfast with a snack.  Patient complaining of 6/10 pain in right foot gave Roxicodone 10 mg.  Will continue to monitor.

## 2018-08-18 NOTE — PROGRESS NOTES
[facility] Medicine Daily Progress Note    Date of Service  8/18/2018    Chief Complaint  68 y.o. male admitted 8/15/2018 with right leg swelling      Interval Problem Update     low platelets  Is 42    Patient has known hx of pancytopenia..     Afebrile    Echo shows ef 50%    Right leg redness and swelling is less    Known chronic kidney disease    Cr is 1.87    Last bnp worse  at 1700    Consultants/Specialty  none    Disposition  home    Review of Systems  Review of Systems   Constitutional: Positive for malaise/fatigue.   HENT: Negative.    Eyes: Negative.    Cardiovascular: Positive for leg swelling.   Gastrointestinal: Negative.    Genitourinary: Negative.    Musculoskeletal: Negative.    Skin: Negative.    Neurological: Negative.    Endo/Heme/Allergies: Bruises/bleeds easily.   Psychiatric/Behavioral: Negative.    All other systems reviewed and are negative.       Physical Exam  Blood Pressure : 118/75   Temperature: 36.5 °C (97.7 °F)   Pulse: 82   Respiration: 18   Pulse Oximetry: 92 %     Physical Exam   Constitutional: He is oriented to person, place, and time. He appears well-developed and well-nourished. No distress.   HENT:   Head: Normocephalic and atraumatic.   Mouth/Throat: No oropharyngeal exudate.   Eyes: EOM are normal. Left eye exhibits no discharge. No scleral icterus.   Neck: Normal range of motion. Neck supple. JVD present.   Cardiovascular: Normal rate and intact distal pulses.  Exam reveals no gallop and no friction rub.    No murmur heard.  irregular   Pulmonary/Chest: Effort normal. No respiratory distress. He has no wheezes. He has no rales.   Abdominal: Soft. He exhibits distension. There is no tenderness. There is no rebound.   Musculoskeletal: He exhibits edema (right leg swelling). He exhibits no tenderness.   Neurological: He is alert and oriented to person, place, and time. No cranial nerve deficit. Coordination normal.   Skin: He is not diaphoretic. There is erythema (right lower  leg).       Fluids    Intake/Output Summary (Last 24 hours) at 08/18/18 0952  Last data filed at 08/18/18 0641   Gross per 24 hour   Intake              440 ml   Output             1500 ml   Net            -1060 ml       Laboratory  Recent Labs      08/15/18   1835  08/16/18   0244  08/17/18   0322  08/18/18   0349   WBC  3.2*  2.0*  2.2*  3.5*   RBC  4.17*  4.03*  3.71*  3.68*   HEMOGLOBIN  10.7*  10.1*  9.4*  9.3*   HEMATOCRIT  33.7*  32.9*  29.7*  29.1*   MCV  80.8*  81.6  80.1*  79.1*   MCH  25.7*  25.1*  25.3*  25.3*   MCHC  31.8*  30.7*  31.6*  32.0*   RDW  51.1*  52.9*  51.9*  52.9*   PLATELETCT  45*  41*  35*  42*   MPV  11.6  10.5   --   11.5     Recent Labs      08/16/18   0243  08/17/18   0322  08/18/18   0349   SODIUM  134*  136  137   POTASSIUM  4.5  4.1  4.1   CHLORIDE  102  104  104   CO2  23  21  23   GLUCOSE  114*  116*  112*   BUN  63*  57*  49*   CREATININE  2.18*  2.02*  1.87*   CALCIUM  8.2*  8.2*  8.4*     Recent Labs      08/15/18   1835  08/16/18   0244   APTT   --   41.4*   INR  4.16*  4.72*     Recent Labs      08/16/18   1731  08/17/18   0322  08/18/18   0349   BNPBTYPENAT  867*  824*  1744*     Recent Labs      08/16/18   0243   TRIGLYCERIDE  270*   HDL  12*   LDL  14       Imaging  ECHOCARDIOGRAM-COMP W/ CONT   Final Result      US-RENAL   Final Result      1.  Unremarkable kidneys. No hydronephrosis.   2.  Prostatomegaly.      LE VENOUS DUPLEX (Specify in Comments Left, Right Or Bilateral)   Final Result      CT-EXTREMITY, LOWER W/O RIGHT   Final Result         1. Cellulitis of the and lower leg extending into the foot. No drainable soft tissue fluid collection. No radiographic evidence of tibial/fibular osteomyelitis.   2. Amputation of the second toe with osseous destruction of the second phalangeal remnant. This may be postoperative or represent osteomyelitis. Correlate clinically for wounds at this level.   3. Charcot arthropathy. No acute fracture.   4. Semiconstrained total knee  arthroplasty is well seated.   5. Severe osteopenia with numerous lytic lucencies throughout the foot, likely related to osteopenia. Multiple myeloma can also have this appearance, but is considered less likely.      OUTSIDE IMAGES-DX CHEST   Final Result           Assessment/Plan  * Cellulitis of leg, right- (present on admission)   Assessment & Plan      Right lower leg cellulitis  IV antibiotics iv unasyn     follow cultures          Acute diastolic heart failure (HCC)- (present on admission)   Assessment & Plan    Unknown baseline    Echo results noted    Diuresis with iv lasix 40mg q12    Cardiac diet              Benign prostatic hyperplasia with lower urinary tract symptoms- (present on admission)   Assessment & Plan    Started flomax 0.4 po qhs on 8/17        Pancytopenia (HCC)- (present on admission)   Assessment & Plan    Known issue    Patient refuses bone marrow biopsy at this time        Thrombocytopenia (HCC)- (present on admission)   Assessment & Plan    Hold eliquis for now    Follow cbc        CKD (chronic kidney disease) stage 3, GFR 30-59 ml/min- (present on admission)   Assessment & Plan    Avoid nsaids and nephrotoxins    Diuresis.. Increased to lasix 40mg iv q12    Check am bmp, bnp        Atrial fibrillation (HCC)- (present on admission)   Assessment & Plan    Chronic  On eliquis( on hold until we see platelet count improve)        Elevated troponin- (present on admission)   Assessment & Plan    Seems to be demand ischemia from leg infection and possible CHF         echo results noted              Anemia- (present on admission)   Assessment & Plan    chronic        HLD (hyperlipidemia)- (present on admission)   Assessment & Plan    resume statin        Gout- (present on admission)   Assessment & Plan    Resume Uloric        GERD (gastroesophageal reflux disease)- (present on admission)   Assessment & Plan    Resume omeprazole        HTN (hypertension)- (present on admission)   Assessment &  Plan    Cont  Po coreg        check am cbc, bmp, bnp

## 2018-08-18 NOTE — PROGRESS NOTES
Report received at bedside.  RN assumed care.  Chart reviewed.  Patient resting in bed. Updated plan of care. Patient verified on telemetry.  Bed alarm on.  Call light in reach.  Bed in lowest position.  Non-slip socks on.  Patient alert and oriented x4.  Pain addressed.

## 2018-08-19 LAB
ANION GAP SERPL CALC-SCNC: 12 MMOL/L (ref 0–11.9)
ANISOCYTOSIS BLD QL SMEAR: ABNORMAL
BASOPHILS # BLD AUTO: 0 % (ref 0–1.8)
BASOPHILS # BLD: 0 K/UL (ref 0–0.12)
BNP SERPL-MCNC: 2589 PG/ML (ref 0–100)
BUN SERPL-MCNC: 51 MG/DL (ref 8–22)
CALCIUM SERPL-MCNC: 8.4 MG/DL (ref 8.5–10.5)
CHLORIDE SERPL-SCNC: 106 MMOL/L (ref 96–112)
CO2 SERPL-SCNC: 23 MMOL/L (ref 20–33)
COMMENT 1642: NORMAL
CREAT SERPL-MCNC: 1.76 MG/DL (ref 0.5–1.4)
EOSINOPHIL # BLD AUTO: 0.03 K/UL (ref 0–0.51)
EOSINOPHIL NFR BLD: 0.8 % (ref 0–6.9)
ERYTHROCYTE [DISTWIDTH] IN BLOOD BY AUTOMATED COUNT: 53.3 FL (ref 35.9–50)
GLUCOSE SERPL-MCNC: 100 MG/DL (ref 65–99)
HCT VFR BLD AUTO: 32.1 % (ref 42–52)
HGB BLD-MCNC: 10.1 G/DL (ref 14–18)
IMM GRANULOCYTES # BLD AUTO: 0.04 K/UL (ref 0–0.11)
IMM GRANULOCYTES NFR BLD AUTO: 1.1 % (ref 0–0.9)
LYMPHOCYTES # BLD AUTO: 0.3 K/UL (ref 1–4.8)
LYMPHOCYTES NFR BLD: 8.2 % (ref 22–41)
MCH RBC QN AUTO: 25.3 PG (ref 27–33)
MCHC RBC AUTO-ENTMCNC: 31.5 G/DL (ref 33.7–35.3)
MCV RBC AUTO: 80.3 FL (ref 81.4–97.8)
MICROCYTES BLD QL SMEAR: ABNORMAL
MONOCYTES # BLD AUTO: 0.35 K/UL (ref 0–0.85)
MONOCYTES NFR BLD AUTO: 9.5 % (ref 0–13.4)
MORPHOLOGY BLD-IMP: NORMAL
NEUTROPHILS # BLD AUTO: 2.96 K/UL (ref 1.82–7.42)
NEUTROPHILS NFR BLD: 80.4 % (ref 44–72)
NRBC # BLD AUTO: 0.02 K/UL
NRBC BLD-RTO: 0.5 /100 WBC
OVALOCYTES BLD QL SMEAR: NORMAL
PLATELET # BLD AUTO: 50 K/UL (ref 164–446)
PLATELET BLD QL SMEAR: NORMAL
PLATELETS.RETICULATED NFR BLD AUTO: 3.1 K/UL (ref 0.6–13.1)
PMV BLD AUTO: 10.8 FL (ref 9–12.9)
POIKILOCYTOSIS BLD QL SMEAR: NORMAL
POTASSIUM SERPL-SCNC: 3.8 MMOL/L (ref 3.6–5.5)
RBC # BLD AUTO: 4 M/UL (ref 4.7–6.1)
RBC BLD AUTO: PRESENT
SODIUM SERPL-SCNC: 141 MMOL/L (ref 135–145)
WBC # BLD AUTO: 3.7 K/UL (ref 4.8–10.8)

## 2018-08-19 PROCEDURE — 700102 HCHG RX REV CODE 250 W/ 637 OVERRIDE(OP): Performed by: HOSPITALIST

## 2018-08-19 PROCEDURE — A9270 NON-COVERED ITEM OR SERVICE: HCPCS | Performed by: HOSPITALIST

## 2018-08-19 PROCEDURE — 700111 HCHG RX REV CODE 636 W/ 250 OVERRIDE (IP): Performed by: HOSPITALIST

## 2018-08-19 PROCEDURE — 770020 HCHG ROOM/CARE - TELE (206)

## 2018-08-19 PROCEDURE — 80048 BASIC METABOLIC PNL TOTAL CA: CPT

## 2018-08-19 PROCEDURE — 700105 HCHG RX REV CODE 258: Performed by: HOSPITALIST

## 2018-08-19 PROCEDURE — 85025 COMPLETE CBC W/AUTO DIFF WBC: CPT

## 2018-08-19 PROCEDURE — 83880 ASSAY OF NATRIURETIC PEPTIDE: CPT

## 2018-08-19 PROCEDURE — 36415 COLL VENOUS BLD VENIPUNCTURE: CPT

## 2018-08-19 PROCEDURE — 99232 SBSQ HOSP IP/OBS MODERATE 35: CPT | Performed by: HOSPITALIST

## 2018-08-19 PROCEDURE — 85055 RETICULATED PLATELET ASSAY: CPT

## 2018-08-19 RX ADMIN — ENALAPRILAT 1.25 MG: 1.25 INJECTION INTRAVENOUS at 17:07

## 2018-08-19 RX ADMIN — FUROSEMIDE 40 MG: 10 INJECTION, SOLUTION INTRAMUSCULAR; INTRAVENOUS at 06:14

## 2018-08-19 RX ADMIN — OMEPRAZOLE 20 MG: 20 CAPSULE, DELAYED RELEASE ORAL at 06:14

## 2018-08-19 RX ADMIN — ATORVASTATIN CALCIUM 20 MG: 20 TABLET, FILM COATED ORAL at 06:14

## 2018-08-19 RX ADMIN — ENALAPRILAT 1.25 MG: 1.25 INJECTION INTRAVENOUS at 01:10

## 2018-08-19 RX ADMIN — TAMSULOSIN HYDROCHLORIDE 0.4 MG: 0.4 CAPSULE ORAL at 08:42

## 2018-08-19 RX ADMIN — OXYCODONE HYDROCHLORIDE 10 MG: 10 TABLET ORAL at 00:15

## 2018-08-19 RX ADMIN — OXYCODONE HYDROCHLORIDE 10 MG: 10 TABLET ORAL at 14:35

## 2018-08-19 RX ADMIN — AMPICILLIN AND SULBACTAM 3 G: 2; 1 INJECTION, POWDER, FOR SOLUTION INTRAVENOUS at 17:04

## 2018-08-19 RX ADMIN — FUROSEMIDE 40 MG: 10 INJECTION, SOLUTION INTRAMUSCULAR; INTRAVENOUS at 17:04

## 2018-08-19 RX ADMIN — CARVEDILOL 12.5 MG: 12.5 TABLET, FILM COATED ORAL at 08:43

## 2018-08-19 RX ADMIN — LEVOTHYROXINE SODIUM 100 MCG: 100 TABLET ORAL at 06:14

## 2018-08-19 RX ADMIN — AMPICILLIN AND SULBACTAM 3 G: 2; 1 INJECTION, POWDER, FOR SOLUTION INTRAVENOUS at 06:13

## 2018-08-19 RX ADMIN — AMPICILLIN AND SULBACTAM 3 G: 2; 1 INJECTION, POWDER, FOR SOLUTION INTRAVENOUS at 14:29

## 2018-08-19 RX ADMIN — CARVEDILOL 12.5 MG: 12.5 TABLET, FILM COATED ORAL at 17:04

## 2018-08-19 RX ADMIN — FEBUXOSTAT 80 MG: 40 TABLET ORAL at 06:14

## 2018-08-19 ASSESSMENT — PAIN SCALES - GENERAL
PAINLEVEL_OUTOF10: 6
PAINLEVEL_OUTOF10: 4
PAINLEVEL_OUTOF10: 0
PAINLEVEL_OUTOF10: 3
PAINLEVEL_OUTOF10: 1
PAINLEVEL_OUTOF10: 3
PAINLEVEL_OUTOF10: 7
PAINLEVEL_OUTOF10: 3
PAINLEVEL_OUTOF10: 3
PAINLEVEL_OUTOF10: 2

## 2018-08-19 ASSESSMENT — PATIENT HEALTH QUESTIONNAIRE - PHQ9
SUM OF ALL RESPONSES TO PHQ9 QUESTIONS 1 AND 2: 0
SUM OF ALL RESPONSES TO PHQ9 QUESTIONS 1 AND 2: 0
2. FEELING DOWN, DEPRESSED, IRRITABLE, OR HOPELESS: NOT AT ALL
1. LITTLE INTEREST OR PLEASURE IN DOING THINGS: NOT AT ALL
2. FEELING DOWN, DEPRESSED, IRRITABLE, OR HOPELESS: NOT AT ALL
1. LITTLE INTEREST OR PLEASURE IN DOING THINGS: NOT AT ALL

## 2018-08-19 ASSESSMENT — ENCOUNTER SYMPTOMS
EYES NEGATIVE: 1
BRUISES/BLEEDS EASILY: 1
NEUROLOGICAL NEGATIVE: 1
GASTROINTESTINAL NEGATIVE: 1
MUSCULOSKELETAL NEGATIVE: 1
PSYCHIATRIC NEGATIVE: 1

## 2018-08-19 NOTE — PROGRESS NOTES
Afib rhythm.  - / 0.1 / -    12 hour chart check and 2 RN skin check:   Pt's skin is somewhat fragile, with bruises and small skin tears on bilateral arms and legs. His Right foot has been wrapped in gauze and there is no sign of drainage/bleeding.

## 2018-08-20 ENCOUNTER — PATIENT OUTREACH (OUTPATIENT)
Dept: HEALTH INFORMATION MANAGEMENT | Facility: OTHER | Age: 68
End: 2018-08-20

## 2018-08-20 VITALS
TEMPERATURE: 97.3 F | HEART RATE: 60 BPM | BODY MASS INDEX: 24.76 KG/M2 | HEIGHT: 74 IN | SYSTOLIC BLOOD PRESSURE: 121 MMHG | RESPIRATION RATE: 14 BRPM | WEIGHT: 192.9 LBS | OXYGEN SATURATION: 96 % | DIASTOLIC BLOOD PRESSURE: 76 MMHG

## 2018-08-20 PROBLEM — L03.115 CELLULITIS OF LEG, RIGHT: Status: RESOLVED | Noted: 2018-08-16 | Resolved: 2018-08-20

## 2018-08-20 PROBLEM — R79.89 ELEVATED TROPONIN: Status: RESOLVED | Noted: 2018-08-16 | Resolved: 2018-08-20

## 2018-08-20 PROBLEM — I50.31 ACUTE DIASTOLIC HEART FAILURE (HCC): Status: RESOLVED | Noted: 2018-08-16 | Resolved: 2018-08-20

## 2018-08-20 LAB
ANION GAP SERPL CALC-SCNC: 10 MMOL/L (ref 0–11.9)
BACTERIA BLD CULT: NORMAL
BACTERIA BLD CULT: NORMAL
BASOPHILS # BLD AUTO: 0.7 % (ref 0–1.8)
BASOPHILS # BLD: 0.03 K/UL (ref 0–0.12)
BNP SERPL-MCNC: 2541 PG/ML (ref 0–100)
BUN SERPL-MCNC: 45 MG/DL (ref 8–22)
CALCIUM SERPL-MCNC: 8.4 MG/DL (ref 8.5–10.5)
CHLORIDE SERPL-SCNC: 104 MMOL/L (ref 96–112)
CO2 SERPL-SCNC: 25 MMOL/L (ref 20–33)
CREAT SERPL-MCNC: 1.55 MG/DL (ref 0.5–1.4)
EOSINOPHIL # BLD AUTO: 0.05 K/UL (ref 0–0.51)
EOSINOPHIL NFR BLD: 1.1 % (ref 0–6.9)
ERYTHROCYTE [DISTWIDTH] IN BLOOD BY AUTOMATED COUNT: 54.8 FL (ref 35.9–50)
GLUCOSE SERPL-MCNC: 90 MG/DL (ref 65–99)
HCT VFR BLD AUTO: 32.2 % (ref 42–52)
HGB BLD-MCNC: 9.8 G/DL (ref 14–18)
IMM GRANULOCYTES # BLD AUTO: 0.13 K/UL (ref 0–0.11)
IMM GRANULOCYTES NFR BLD AUTO: 2.9 % (ref 0–0.9)
LYMPHOCYTES # BLD AUTO: 0.46 K/UL (ref 1–4.8)
LYMPHOCYTES NFR BLD: 10.2 % (ref 22–41)
MCH RBC QN AUTO: 24.7 PG (ref 27–33)
MCHC RBC AUTO-ENTMCNC: 30.4 G/DL (ref 33.7–35.3)
MCV RBC AUTO: 81.3 FL (ref 81.4–97.8)
MONOCYTES # BLD AUTO: 0.3 K/UL (ref 0–0.85)
MONOCYTES NFR BLD AUTO: 6.6 % (ref 0–13.4)
NEUTROPHILS # BLD AUTO: 3.55 K/UL (ref 1.82–7.42)
NEUTROPHILS NFR BLD: 78.5 % (ref 44–72)
NRBC # BLD AUTO: 0 K/UL
NRBC BLD-RTO: 0 /100 WBC
PLATELET # BLD AUTO: 66 K/UL (ref 164–446)
PMV BLD AUTO: 11.2 FL (ref 9–12.9)
POTASSIUM SERPL-SCNC: 3.7 MMOL/L (ref 3.6–5.5)
RBC # BLD AUTO: 3.96 M/UL (ref 4.7–6.1)
SIGNIFICANT IND 70042: NORMAL
SIGNIFICANT IND 70042: NORMAL
SITE SITE: NORMAL
SITE SITE: NORMAL
SODIUM SERPL-SCNC: 139 MMOL/L (ref 135–145)
SOURCE SOURCE: NORMAL
SOURCE SOURCE: NORMAL
WBC # BLD AUTO: 4.5 K/UL (ref 4.8–10.8)

## 2018-08-20 PROCEDURE — 700102 HCHG RX REV CODE 250 W/ 637 OVERRIDE(OP): Performed by: HOSPITALIST

## 2018-08-20 PROCEDURE — 85025 COMPLETE CBC W/AUTO DIFF WBC: CPT

## 2018-08-20 PROCEDURE — 36415 COLL VENOUS BLD VENIPUNCTURE: CPT

## 2018-08-20 PROCEDURE — 80048 BASIC METABOLIC PNL TOTAL CA: CPT

## 2018-08-20 PROCEDURE — 83880 ASSAY OF NATRIURETIC PEPTIDE: CPT

## 2018-08-20 PROCEDURE — 700111 HCHG RX REV CODE 636 W/ 250 OVERRIDE (IP): Performed by: HOSPITALIST

## 2018-08-20 PROCEDURE — 99239 HOSP IP/OBS DSCHRG MGMT >30: CPT | Performed by: HOSPITALIST

## 2018-08-20 PROCEDURE — A9270 NON-COVERED ITEM OR SERVICE: HCPCS | Performed by: HOSPITALIST

## 2018-08-20 PROCEDURE — 700105 HCHG RX REV CODE 258: Performed by: HOSPITALIST

## 2018-08-20 RX ORDER — LOSARTAN POTASSIUM 100 MG/1
50 TABLET ORAL EVERY MORNING
Qty: 30 TAB | Refills: 0 | Status: SHIPPED | OUTPATIENT
Start: 2018-08-20

## 2018-08-20 RX ORDER — AMOXICILLIN AND CLAVULANATE POTASSIUM 875; 125 MG/1; MG/1
1 TABLET, FILM COATED ORAL 2 TIMES DAILY
Qty: 10 TAB | Refills: 0 | Status: SHIPPED | OUTPATIENT
Start: 2018-08-20

## 2018-08-20 RX ORDER — TAMSULOSIN HYDROCHLORIDE 0.4 MG/1
0.4 CAPSULE ORAL
Qty: 30 CAP | Refills: 1 | Status: SHIPPED | OUTPATIENT
Start: 2018-08-21

## 2018-08-20 RX ORDER — CARVEDILOL 25 MG/1
12.5 TABLET ORAL 2 TIMES DAILY WITH MEALS
Qty: 60 TAB | Refills: 3 | Status: SHIPPED | OUTPATIENT
Start: 2018-08-20 | End: 2018-08-20

## 2018-08-20 RX ORDER — CARVEDILOL 25 MG/1
12.5 TABLET ORAL 2 TIMES DAILY WITH MEALS
Qty: 60 TAB | Refills: 3 | Status: SHIPPED | OUTPATIENT
Start: 2018-08-20

## 2018-08-20 RX ORDER — AMOXICILLIN AND CLAVULANATE POTASSIUM 875; 125 MG/1; MG/1
1 TABLET, FILM COATED ORAL 2 TIMES DAILY
Qty: 10 TAB | Refills: 0 | Status: SHIPPED | OUTPATIENT
Start: 2018-08-20 | End: 2018-08-20

## 2018-08-20 RX ORDER — LOSARTAN POTASSIUM 100 MG/1
50 TABLET ORAL EVERY MORNING
Qty: 30 TAB | Refills: 0 | Status: SHIPPED | OUTPATIENT
Start: 2018-08-20 | End: 2018-08-20

## 2018-08-20 RX ADMIN — AMPICILLIN AND SULBACTAM 3 G: 2; 1 INJECTION, POWDER, FOR SOLUTION INTRAVENOUS at 00:45

## 2018-08-20 RX ADMIN — ENALAPRILAT 1.25 MG: 1.25 INJECTION INTRAVENOUS at 08:48

## 2018-08-20 RX ADMIN — LEVOTHYROXINE SODIUM 100 MCG: 100 TABLET ORAL at 05:47

## 2018-08-20 RX ADMIN — FEBUXOSTAT 80 MG: 40 TABLET ORAL at 05:48

## 2018-08-20 RX ADMIN — OMEPRAZOLE 20 MG: 20 CAPSULE, DELAYED RELEASE ORAL at 05:47

## 2018-08-20 RX ADMIN — DOCUSATE SODIUM -SENNOSIDES 2 TABLET: 50; 8.6 TABLET, COATED ORAL at 05:47

## 2018-08-20 RX ADMIN — CARVEDILOL 12.5 MG: 12.5 TABLET, FILM COATED ORAL at 08:51

## 2018-08-20 RX ADMIN — TAMSULOSIN HYDROCHLORIDE 0.4 MG: 0.4 CAPSULE ORAL at 09:31

## 2018-08-20 RX ADMIN — OXYCODONE HYDROCHLORIDE 10 MG: 10 TABLET ORAL at 01:14

## 2018-08-20 RX ADMIN — FUROSEMIDE 40 MG: 10 INJECTION, SOLUTION INTRAMUSCULAR; INTRAVENOUS at 05:47

## 2018-08-20 RX ADMIN — AMPICILLIN AND SULBACTAM 3 G: 2; 1 INJECTION, POWDER, FOR SOLUTION INTRAVENOUS at 11:42

## 2018-08-20 RX ADMIN — ATORVASTATIN CALCIUM 20 MG: 20 TABLET, FILM COATED ORAL at 05:47

## 2018-08-20 RX ADMIN — AMPICILLIN AND SULBACTAM 3 G: 2; 1 INJECTION, POWDER, FOR SOLUTION INTRAVENOUS at 05:47

## 2018-08-20 ASSESSMENT — PAIN SCALES - GENERAL
PAINLEVEL_OUTOF10: 0
PAINLEVEL_OUTOF10: 7
PAINLEVEL_OUTOF10: 2

## 2018-08-20 NOTE — PROGRESS NOTES
2 RN SKIN CHECK  REDNESS BEHIND RT EAR LINE FROM HIS EYEGLASSES  SCAB TO RT CHEEK  BILATERAL UPPER ARMS VARIOUS BRUISING IN DIFFERENT STAGES OF HEALING  RUE DRESSED SKIN TEAR-CDI  LUE SKIN TEAR DRESSED CDI  BOTH BUTTOCKS  AND SACRAL AREA- REDNESS BLANCHABLE WITH INCONTINENCE DEMATITIS  RLE CELLULITIS DISCOLORATION  RT FOOT PLANTAR HARDENED QUARTER SIZE D POSS HEALING ULCER  RT FOOT TOES SCABS  LEFT FOOT MEDIAL LARGE TOE SCAB REDNESS TO LARGE TOE BLANCHABLE

## 2018-08-20 NOTE — CARE PLAN
Problem: Safety  Goal: Will remain free from falls  Outcome: PROGRESSING AS EXPECTED  Pt mobility assessed at beginning of shift. Pt is standby assist. Fall precautions in place. Non-slip socks on. Bed in lowest locked position. Bed alarm on. Call light within reach. Pt educated to call for assistance and verbalizes understanding.

## 2018-08-20 NOTE — PROGRESS NOTES
[facility] Medicine Daily Progress Note    Date of Service  8/19/2018    Chief Complaint  68 y.o. male admitted 8/15/2018 with right leg swelling      Interval Problem Update    Patient states he feels well     low platelets  Is 50    Patient has known hx of pancytopenia..     Afebrile    Echo shows ef 50%    Right leg redness and swelling is less    Known chronic kidney disease    Cr is 1.76    Last bnp worse  at 2500    Consultants/Specialty  none    Disposition  home    Review of Systems  Review of Systems   Constitutional: Negative for malaise/fatigue.   HENT: Negative.    Eyes: Negative.    Cardiovascular: Positive for leg swelling.   Gastrointestinal: Negative.    Genitourinary: Negative.    Musculoskeletal: Negative.    Skin: Negative.    Neurological: Negative.    Endo/Heme/Allergies: Bruises/bleeds easily.   Psychiatric/Behavioral: Negative.    All other systems reviewed and are negative.       Physical Exam  Blood Pressure : 118/75   Temperature: 36.5 °C (97.7 °F)   Pulse: 82   Respiration: 18   Pulse Oximetry: 92 %     Physical Exam   Constitutional: He is oriented to person, place, and time. He appears well-developed and well-nourished. No distress.   HENT:   Head: Normocephalic and atraumatic.   Mouth/Throat: No oropharyngeal exudate.   Eyes: EOM are normal. Left eye exhibits no discharge. No scleral icterus.   Neck: Normal range of motion. Neck supple. No JVD present.   Cardiovascular: Normal rate and intact distal pulses.  Exam reveals no gallop and no friction rub.    No murmur heard.  irregular   Pulmonary/Chest: Effort normal. No respiratory distress. He has no wheezes. He has no rales.   Abdominal: Soft. He exhibits distension. There is no tenderness. There is no rebound.   Musculoskeletal: He exhibits edema (right leg swelling). He exhibits no tenderness.   Neurological: He is alert and oriented to person, place, and time. No cranial nerve deficit. Coordination normal.   Skin: He is not  diaphoretic. There is erythema (right lower leg).       Fluids    Intake/Output Summary (Last 24 hours) at 08/19/18 1858  Last data filed at 08/19/18 1300   Gross per 24 hour   Intake              920 ml   Output             2500 ml   Net            -1580 ml       Laboratory  Recent Labs      08/17/18   0322  08/18/18   0349  08/19/18   0330   WBC  2.2*  3.5*  3.7*   RBC  3.71*  3.68*  4.00*   HEMOGLOBIN  9.4*  9.3*  10.1*   HEMATOCRIT  29.7*  29.1*  32.1*   MCV  80.1*  79.1*  80.3*   MCH  25.3*  25.3*  25.3*   MCHC  31.6*  32.0*  31.5*   RDW  51.9*  52.9*  53.3*   PLATELETCT  35*  42*  50*   MPV   --   11.5  10.8     Recent Labs      08/17/18   0322  08/18/18   0349  08/19/18   0330   SODIUM  136  137  141   POTASSIUM  4.1  4.1  3.8   CHLORIDE  104  104  106   CO2  21  23  23   GLUCOSE  116*  112*  100*   BUN  57*  49*  51*   CREATININE  2.02*  1.87*  1.76*   CALCIUM  8.2*  8.4*  8.4*         Recent Labs      08/17/18   0322  08/18/18   0349  08/19/18   0330   BNPBTYPENAT  824*  1744*  2589*           Imaging  ECHOCARDIOGRAM-COMP W/ CONT   Final Result      US-RENAL   Final Result      1.  Unremarkable kidneys. No hydronephrosis.   2.  Prostatomegaly.      LE VENOUS DUPLEX (Specify in Comments Left, Right Or Bilateral)   Final Result      CT-EXTREMITY, LOWER W/O RIGHT   Final Result         1. Cellulitis of the and lower leg extending into the foot. No drainable soft tissue fluid collection. No radiographic evidence of tibial/fibular osteomyelitis.   2. Amputation of the second toe with osseous destruction of the second phalangeal remnant. This may be postoperative or represent osteomyelitis. Correlate clinically for wounds at this level.   3. Charcot arthropathy. No acute fracture.   4. Semiconstrained total knee arthroplasty is well seated.   5. Severe osteopenia with numerous lytic lucencies throughout the foot, likely related to osteopenia. Multiple myeloma can also have this appearance, but is considered less  likely.      OUTSIDE IMAGES-DX CHEST   Final Result           Assessment/Plan  * Cellulitis of leg, right- (present on admission)   Assessment & Plan      Right lower leg cellulitis  IV antibiotics iv unasyn     follow cultures          Acute diastolic heart failure (HCC)- (present on admission)   Assessment & Plan    Unknown baseline    Echo results noted    Diuresis with iv lasix 40mg q12    Cardiac diet              Benign prostatic hyperplasia with lower urinary tract symptoms- (present on admission)   Assessment & Plan     flomax 0.4 po qhs on 8/17        Pancytopenia (HCC)- (present on admission)   Assessment & Plan    Known issue    Patient refuses bone marrow biopsy at this time        Thrombocytopenia (HCC)- (present on admission)   Assessment & Plan    Hold eliquis for now    Follow cbc        CKD (chronic kidney disease) stage 3, GFR 30-59 ml/min- (present on admission)   Assessment & Plan    Avoid nsaids and nephrotoxins    Diuresis.. lasix 40mg iv q12    Check am bmp, bnp        Atrial fibrillation (HCC)- (present on admission)   Assessment & Plan    Chronic  On eliquis( on hold until we see platelet count improve)        Elevated troponin- (present on admission)   Assessment & Plan    Seems to be demand ischemia from leg infection and possible CHF         echo results noted              Anemia- (present on admission)   Assessment & Plan    chronic        HLD (hyperlipidemia)- (present on admission)   Assessment & Plan    resume statin        Gout- (present on admission)   Assessment & Plan    Resume Uloric        GERD (gastroesophageal reflux disease)- (present on admission)   Assessment & Plan    Resume omeprazole        HTN (hypertension)- (present on admission)   Assessment & Plan    Cont  Po coreg    Diuresis with lasix        check am cbc, bmp, bnp

## 2018-08-20 NOTE — DISCHARGE PLANNING
Anticipated Discharge Disposition: Home     Action: LSW spoke directly with Pt at bedside, Pt advised he lives in [facility] with wife, LSW dicussed discharge planning with Pt, LSW informed Pt plans to travel back to [place] for a few days as his family had rented a house for vacation, Pt requested RX be sent to another pharmacy as he will not be in [place] for nearly a week. LSW informed bedside RN     Barriers to Discharge: none     Plan:Pt to discharge home (with family) when medically cleared     Care Transition Team Assessment    Information Source  Orientation : Oriented x 4  Information Given By: Patient  Informant's Name: Lazaro    Readmission Evaluation  Is this a readmission?: No    Elopement Risk  Legal Hold: No  Ambulatory or Self Mobile in Wheelchair: Yes  Disoriented: No  Psychiatric Symptoms: None  History of Wandering: No  Elopement this Admit: No  Vocalizing Wanting to Leave: No  Displays Behaviors, Body Language Wanting to Leave: No-Not at Risk for Elopement  Elopement Risk: Not at Risk for Elopement    Interdisciplinary Discharge Planning  Patient or legal guardian wants to designate a caregiver (see row info): No    Discharge Preparedness  What is your plan after discharge?: Home with help  What are your discharge supports?: Spouse  Prior Functional Level: Independent with Activities of Daily Living  Difficulity with IADLs: Shopping    Functional Assesment  Prior Functional Level: Independent with Activities of Daily Living    Finances  Financial Barriers to Discharge: No  Prescription Coverage: Yes    Vision / Hearing Impairment  Vision Impairment : Yes  Right Eye Vision: Impaired, Wears Glasses  Left Eye Vision: Impaired, Wears Glasses  Hearing Impairment : Yes  Hearing Impairment: Left Ear, Hearing Device Not Available    Values / Beliefs / Concerns  Values / Beliefs Concerns : No    Advance Directive  Advance Directive?: Living Will    Domestic Abuse  Have you ever been the victim of  abuse or violence?: No  Physical Abuse or Sexual Abuse: No  Verbal Abuse or Emotional Abuse: No  Possible Abuse Reported to:: Not Applicable

## 2018-08-20 NOTE — CARE PLAN
Problem: Safety  Goal: Will remain free from falls    Intervention: Implement fall precautions  WILL DO PURPOSEFUL HOURLY ROUNDING, WILL REMIND PT TO USE CALL INFANTE FOR ASSISTANCE

## 2018-08-20 NOTE — CARE PLAN
Problem: Knowledge Deficit  Goal: Knowledge of disease process/condition, treatment plan, diagnostic tests, and medications will improve  Outcome: PROGRESSING AS EXPECTED  Pt educated about disease process. Reason why medications are taken. And informed about treatment plan.

## 2018-08-20 NOTE — PROGRESS NOTES
REC'D BEDSIDE REPORT FROM ERIC RN*. PT AOX4*. PT DENIES PAIN, SHORTNESS OF BREATH. PT BED LINEN CHANGED COMPLETELY, PT CLEANED.  PLAN OF CARE AND FALL PRECAUTIONS REVIEWED WITH PT. PT DID VERBALIZE UNDERSTANDING, BED ALARM ENGAGED, CALL INFANTE LEFT IN REACH

## 2018-08-20 NOTE — PROGRESS NOTES
Discharge instructions give to patient at bedside. Pt verbalizes understanding and states plans for follow up. New and home medications reviewed, post discharge activity level and worsening of symptoms needing follow up care discussed. Telemetry monitor and IV cathlon removed. All belongings accounted for, all questions answered at this time. Pt walked with car to sister.

## 2018-08-20 NOTE — CARE PLAN
Problem: Communication  Goal: The ability to communicate needs accurately and effectively will improve  Outcome: PROGRESSING AS EXPECTED  Patient communicates effectively.  All needs met. Will continue to monitor.       Problem: Safety  Goal: Will remain free from falls  Outcome: PROGRESSING AS EXPECTED  Bed in lowest, locked position.  Treaded slipper socks on, appropriate signs in place, and call light in reach.

## 2018-08-20 NOTE — PROGRESS NOTES
REDNESS BEHIND RT EAR LINE FROM HIS EYEGLASSES  SCAB TO RT CHEEK  BILATERAL UPPER ARMS VARIOUS BRUISING IN DIFFERENT STAGES OF HEALING  RUE DRESSED SKIN TEAR-CDI  LUE SKIN TEAR DRESSED CDI  BOTH BUTTOCKS  AND SACRAL AREA- REDNESS BLANCHABLE WITH INCONTINENCE DEMATITIS  RLE CELLULITIS DISCOLORATION  RT FOOT PLANTAR HARDENED QUARTER SIZE D POSS HEALING ULCER  RT FOOT TOES SCABS  LEFT FOOT MEDIAL LARGE TOE SCAB REDNESS TO LARGE TOE BLANCHABLE

## 2018-08-20 NOTE — PROGRESS NOTES
Assumed care of PT A&Ox 4. Pt resting in bed with no signs of labored breathing. On RA. Tele monitor in place, cardiac rhythm being monitored. Call light within reach, bed in lowest position, upper bed rails up, bed alarm on. Pt was updated on plan of care for the day . Will continue to monitor.

## 2018-08-20 NOTE — DISCHARGE INSTRUCTIONS
Discharge Instructions    Discharged to home by car with relative. Discharged via wheelchair, hospital escort: Yes.  Special equipment needed: Not Applicable    Be sure to schedule a follow-up appointment with your primary care doctor or any specialists as instructed.     Discharge Plan:   Diet Plan: Discussed  Activity Level: Discussed  Confirmed Follow up Appointment: Appointment Scheduled  Confirmed Symptoms Management: Discussed  Medication Reconciliation Updated: Yes  Influenza Vaccine Indication: Not indicated: Previously immunized this influenza season and > 8 years of age    I understand that a diet low in cholesterol, fat, and sodium is recommended for good health. Unless I have been given specific instructions below for another diet, I accept this instruction as my diet prescription.   Other diet: cardiac    Special Instructions: None    · Is patient discharged on Warfarin / Coumadin?   ·   No     Heart Failure  Heart failure is a condition in which the heart has trouble pumping blood because it has become weak or stiff. This means that the heart does not pump blood efficiently for the body to work well. For some people with heart failure, fluid may back up into the lungs and there may be swelling (edema) in the lower legs. Heart failure is usually a long-term (chronic) condition. It is important for you to take good care of yourself and follow the treatment plan from your health care provider.  What are the causes?  This condition is caused by some health problems, including:  · High blood pressure (hypertension). Hypertension causes the heart muscle to work harder than normal. High blood pressure eventually causes the heart to become stiff and weak.  · Coronary artery disease (CAD). CAD is the buildup of cholesterol and fat (plaques) in the arteries of the heart.  · Heart attack (myocardial infarction). Injured tissue, which is caused by the heart attack, does not contract as well and the heart's ability  to pump blood is weakened.  · Abnormal heart valves. When the heart valves do not open and close properly, the heart muscle must pump harder to keep the blood flowing.  · Heart muscle disease (cardiomyopathy or myocarditis). Heart muscle disease is damage to the heart muscle from a variety of causes, such as drug or alcohol abuse, infections, or unknown causes. These can increase the risk of heart failure.  · Lung disease. When the lungs do not work properly, the heart must work harder.  What increases the risk?  Risk of heart failure increases as a person ages. This condition is also more likely to develop in people who:  · Are overweight.  · Are male.  · Smoke or chew tobacco.  · Abuse alcohol or illegal drugs.  · Have taken medicines that can damage the heart, such as chemotherapy drugs.  · Have diabetes.  ¨ High blood sugar (glucose) is associated with high fat (lipid) levels in the blood.  ¨ Diabetes can also damage tiny blood vessels that carry nutrients to the heart muscle.  · Have abnormal heart rhythms.  · Have thyroid problems.  · Have low blood counts (anemia).  What are the signs or symptoms?  Symptoms of this condition include:  · Shortness of breath with activity, such as when climbing stairs.  · Persistent cough.  · Swelling of the feet, ankles, legs, or abdomen.  · Unexplained weight gain.  · Difficulty breathing when lying flat (orthopnea).  · Waking from sleep because of the need to sit up and get more air.  · Rapid heartbeat.  · Fatigue and loss of energy.  · Feeling light-headed, dizzy, or close to fainting.  · Loss of appetite.  · Nausea.  · Increased urination during the night (nocturia).  · Confusion.  How is this diagnosed?  This condition is diagnosed based on:  · Medical history, symptoms, and a physical exam.  · Diagnostic tests, which may include:  ¨ Echocardiogram.  ¨ Electrocardiogram (ECG).  ¨ Chest X-ray.  ¨ Blood tests.  ¨ Exercise stress test.  ¨ Radionuclide scans.  ¨ Cardiac  catheterization and angiogram.  How is this treated?  Treatment for this condition is aimed at managing the symptoms of heart failure. Medicines, behavioral changes, or other treatments may be necessary to treat heart failure.  Medicines   These may include:  · Angiotensin-converting enzyme (ACE) inhibitors. This type of medicine blocks the effects of a blood protein called angiotensin-converting enzyme. ACE inhibitors relax (dilate) the blood vessels and help to lower blood pressure.  · Angiotensin receptor blockers (ARBs). This type of medicine blocks the actions of a blood protein called angiotensin. ARBs dilate the blood vessels and help to lower blood pressure.  · Water pills (diuretics). Diuretics cause the kidneys to remove salt and water from the blood. The extra fluid is removed through urination, leaving a lower volume of blood that the heart has to pump.  · Beta blockers. These improve heart muscle strength and they prevent the heart from beating too quickly.  · Digoxin. This increases the force of the heartbeat.  Healthy behavior changes   These may include:  · Reaching and maintaining a healthy weight.  · Stopping smoking or chewing tobacco.  · Eating heart-healthy foods.  · Limiting or avoiding alcohol.  · Stopping use of street drugs (illegal drugs).  · Physical activity.  Other treatments   These may include:  · Surgery to open blocked coronary arteries or repair damaged heart valves.  · Placement of a biventricular pacemaker to improve heart muscle function (cardiac resynchronization therapy). This device paces both the right ventricle and left ventricle.  · Placement of a device to treat serious abnormal heart rhythms (implantable cardioverter defibrillator, or ICD).  · Placement of a device to improve the pumping ability of the heart (left ventricular assist device, or LVAD).  · Heart transplant. This can cure heart failure, and it is considered for certain patients who do not improve with other  therapies.  Follow these instructions at home:  Medicines  · Take over-the-counter and prescription medicines only as told by your health care provider. Medicines are important in reducing the workload of your heart, slowing the progression of heart failure, and improving your symptoms.  ¨ Do not stop taking your medicine unless your health care provider told you to do that.  ¨ Do not skip any dose of medicine.  ¨ Refill your prescriptions before you run out of medicine. You need your medicines every day.  Eating and drinking  · Eat heart-healthy foods. Talk with a dietitian to make an eating plan that is right for you.  ¨ Choose foods that contain no trans fat and are low in saturated fat and cholesterol. Healthy choices include fresh or frozen fruits and vegetables, fish, lean meats, legumes, fat-free or low-fat dairy products, and whole-grain or high-fiber foods.  ¨ Limit salt (sodium) if directed by your health care provider. Sodium restriction may reduce symptoms of heart failure. Ask a dietitian to recommend heart-healthy seasonings.  ¨ Use healthy cooking methods instead of frying. Healthy methods include roasting, grilling, broiling, baking, poaching, steaming, and stir-frying.  · Limit your fluid intake if directed by your health care provider. Fluid restriction may reduce symptoms of heart failure.  Lifestyle  · Stop smoking or using chewing tobacco. Nicotine and tobacco can damage your heart and your blood vessels. Do not use nicotine gum or patches before talking to your health care provider.  · Limit alcohol intake to no more than 1 drink per day for non-pregnant women and 2 drinks per day for men. One drink equals 12 oz of beer, 5 oz of wine, or 1½ oz of hard liquor.  ¨ Drinking more than that is harmful to your heart. Tell your health care provider if you drink alcohol several times a week.  ¨ Talk with your health care provider about whether any level of alcohol use is safe for you.  ¨ If your heart  has already been damaged by alcohol or you have severe heart failure, drinking alcohol should be stopped completely.  · Stop use of illegal drugs.  · Lose weight if directed by your health care provider. Weight loss may reduce symptoms of heart failure.  · Do moderate physical activity if directed by your health care provider. People who are elderly and people with severe heart failure should consult with a health care provider for physical activity recommendations.  Monitor important information  · Weigh yourself every day. Keeping track of your weight daily helps you to notice excess fluid sooner.  ¨ Weigh yourself every morning after you urinate and before you eat breakfast.  ¨ Wear the same amount of clothing each time you weigh yourself.  ¨ Record your daily weight. Provide your health care provider with your weight record.  · Monitor and record your blood pressure as told by your health care provider.  · Check your pulse as told by your health care provider.  Dealing with extreme temperatures  · If the weather is extremely hot:  ¨ Avoid vigorous physical activity.  ¨ Use air conditioning or fans or seek a cooler location.  ¨ Avoid caffeine and alcohol.  ¨ Wear loose-fitting, lightweight, and light-colored clothing.  · If the weather is extremely cold:  ¨ Avoid vigorous physical activity.  ¨ Layer your clothes.  ¨ Wear mittens or gloves, a hat, and a scarf when you go outside.  ¨ Avoid alcohol.  General instructions  · Manage other health conditions such as hypertension, diabetes, thyroid disease, or abnormal heart rhythms as told by your health care provider.  · Learn to manage stress. If you need help to do this, ask your health care provider.  · Plan rest periods when fatigued.  · Get ongoing education and support as needed.  · Participate in or seek rehabilitation as needed to maintain or improve independence and quality of life.  · Stay up to date with immunizations. Keeping current on pneumococcal and  influenza immunizations is especially important to prevent respiratory infections.  · Keep all follow-up visits as told by your health care provider. This is important.  Contact a health care provider if:  · You have a rapid weight gain.  · You have increasing shortness of breath that is unusual for you.  · You are unable to participate in your usual physical activities.  · You tire easily.  · You cough more than normal, especially with physical activity.  · You have any swelling or more swelling in areas such as your hands, feet, ankles, or abdomen.  · You are unable to sleep because it is hard to breathe.  · You feel like your heart is beating quickly (palpitations).  · You become dizzy or light-headed when you stand up.  Get help right away if:  · You have difficulty breathing.  · You notice or your family notices a change in your awareness, such as having trouble staying awake or having difficulty with concentration.  · You have pain or discomfort in your chest.  · You have an episode of fainting (syncope).  This information is not intended to replace advice given to you by your health care provider. Make sure you discuss any questions you have with your health care provider.  Document Released: 12/18/2006 Document Revised: 08/22/2017 Document Reviewed: 07/12/2017  [employer] Interactive Patient Education © 2017 [employer]      Cellulitis, Adult  Introduction  Cellulitis is a skin infection. The infected area is usually red and sore. This condition occurs most often in the arms and lower legs. It is very important to get treated for this condition.  Follow these instructions at home:  · Take over-the-counter and prescription medicines only as told by your doctor.  · If you were prescribed an antibiotic medicine, take it as told by your doctor. Do not stop taking the antibiotic even if you start to feel better.  · Drink enough fluid to keep your pee (urine) clear or pale yellow.  · Do not touch or rub the  infected area.  · Raise (elevate) the infected area above the level of your heart while you are sitting or lying down.  · Place warm or cold wet cloths (warm or cold compresses) on the infected area. Do this as told by your doctor.  · Keep all follow-up visits as told by your doctor. This is important. These visits let your doctor make sure your infection is not getting worse.  Contact a doctor if:  · You have a fever.  · Your symptoms do not get better after 1-2 days of treatment.  · Your bone or joint under the infected area starts to hurt after the skin has healed.  · Your infection comes back. This can happen in the same area or another area.  · You have a swollen bump in the infected area.  · You have new symptoms.  · You feel ill and also have muscle aches and pains.  Get help right away if:  · Your symptoms get worse.  · You feel very sleepy.  · You throw up (vomit) or have watery poop (diarrhea) for a long time.  · There are red streaks coming from the infected area.  · Your red area gets larger.  · Your red area turns darker.  This information is not intended to replace advice given to you by your health care provider. Make sure you discuss any questions you have with your health care provider.  Document Released: 06/05/2009 Document Revised: 05/25/2017 Document Reviewed: 10/26/2016  © 2017 [employer]    Depression / Suicide Risk    As you are discharged from this [facility] facility, it is important to learn how to keep safe from harming yourself.    Recognize the warning signs:  · Abrupt changes in personality, positive or negative- including increase in energy   · Giving away possessions  · Change in eating patterns- significant weight changes-  positive or negative  · Change in sleeping patterns- unable to sleep or sleeping all the time   · Unwillingness or inability to communicate  · Depression  · Unusual sadness, discouragement and loneliness  · Talk of wanting to die  · Neglect of personal  appearance   · Rebelliousness- reckless behavior  · Withdrawal from people/activities they love  · Confusion- inability to concentrate     If you or a loved one observes any of these behaviors or has concerns about self-harm, here's what you can do:  · Talk about it- your feelings and reasons for harming yourself  · Remove any means that you might use to hurt yourself (examples: pills, rope, extension cords, firearm)  · Get professional help from the community (Mental Health, Substance Abuse, psychological counseling)  · Do not be alone:Call your Safe Contact- someone whom you trust who will be there for you.  · Call your local CRISIS HOTLINE 502-0453 or 365-803-5724  · Call your local Children's Mobile Crisis Response Team [place] (052) 452-3323 or www.[employer]  · Call the toll free National Suicide Prevention Hotlines   · National Suicide Prevention Lifeline 579-247-UZIG (1956)  · National Hope Line Network 800-SUICIDE (991-6777)

## 2018-08-21 ENCOUNTER — PATIENT OUTREACH (OUTPATIENT)
Dept: HEALTH INFORMATION MANAGEMENT | Facility: OTHER | Age: 68
End: 2018-08-21

## 2018-08-21 NOTE — DISCHARGE SUMMARY
Discharge Summary    CHIEF COMPLAINT ON ADMISSION  Chief Complaint   Patient presents with   • Leg Swelling       Reason for Admission  Transfer     Admission Date  8/15/2018    CODE STATUS  Prior    HPI & HOSPITAL COURSE  This is a 68 y.o. male here with right foot swelling and redness. He was started on iv unasyn..he had thrombocytopenia with a known hx of pancytopenia. Re refused bone marrow biopsy during this hospital stay. We held his eliquis for which he takes for chronic AFIB. With treatment of the infection, the platelets did improve. He was treated for acute diastolic heart failure with diuresis with iv lasix. He has known chronic kidney disease and we titrated his coozar down to 50mg po daily. He had and enlarged prostate for which we started flomax    The patient will f/u with hx pcp to recheck platelets count- I recommended restarting eliquis when his platelets are closer to his baseline  of 100       Therefore, he is discharged in good and stable condition to home with close outpatient follow-up.    The patient met 2-midnight criteria for an inpatient stay at the time of discharge.    Discharge Date  8/20/2018    FOLLOW UP ITEMS POST DISCHARGE  pcp 1 week    DISCHARGE DIAGNOSES  Principal Problem (Resolved):    Cellulitis of leg, right POA: Yes  Active Problems:    HTN (hypertension) POA: Yes    GERD (gastroesophageal reflux disease) POA: Yes    Gout POA: Yes    HLD (hyperlipidemia) POA: Yes    Anemia POA: Yes    Atrial fibrillation (HCC) POA: Yes    CKD (chronic kidney disease) stage 3, GFR 30-59 ml/min POA: Yes    Thrombocytopenia (HCC) POA: Yes    Pancytopenia (HCC) POA: Yes    Benign prostatic hyperplasia with lower urinary tract symptoms POA: Yes  Resolved Problems:    Acute diastolic heart failure (HCC) POA: Yes    Elevated troponin POA: Yes      FOLLOW UP  No future appointments.  Masood Hester MD (Cardiology)  [address] [place] [address] ,   [place] [address] [phone]  Go on  9/11/2018  Please arrive at 2:20pm for your 2:40pm appointment. Thank you    Shahnaz Knight MD (Primary Care)  [address] [place] [address]  [phone]    Office will be calling you to arrange a follow up appointment. Thank you    [address] [address]  [phone]  Go on 8/21/2018  Walk in at 8:00 am for a 7 day appointment with primary care.      MEDICATIONS ON DISCHARGE     Medication List      START taking these medications      Instructions   amoxicillin-clavulanate 875-125 MG Tabs  Commonly known as:  AUGMENTIN   Take 1 Tab by mouth 2 times a day.  Dose:  1 Tab     tamsulosin 0.4 MG capsule  Commonly known as:  FLOMAX   Take 1 Cap by mouth ONE-HALF HOUR AFTER BREAKFAST.  Dose:  0.4 mg        CHANGE how you take these medications      Instructions   carvedilol 25 MG Tabs  What changed:  how much to take  Commonly known as:  COREG   Take 0.5 Tabs by mouth 2 times a day, with meals.  Dose:  12.5 mg     losartan 100 MG Tabs  What changed:  how much to take  Commonly known as:  COZAAR   Take 0.5 Tabs by mouth every morning.  Dose:  50 mg        CONTINUE taking these medications      Instructions   atorvastatin 20 MG Tabs  Commonly known as:  LIPITOR   Take 20 mg by mouth every morning.  Dose:  20 mg     bumetanide 1 MG Tabs  Commonly known as:  BUMEX   Take 2 mg by mouth every morning.  Dose:  2 mg     ELIQUIS 5mg Tabs  Generic drug:  apixaban   Take 5 mg by mouth 2 Times a Day.  Dose:  5 mg     levothyroxine 100 MCG Tabs  Commonly known as:  SYNTHROID   Take 100 mcg by mouth Every morning on an empty stomach.  Dose:  100 mcg     NEXIUM 40 MG delayed-release capsule  Generic drug:  esomeprazole   Take 40 mg by mouth every morning before breakfast.  Dose:  40 mg     ULORIC 80 MG Tabs  Generic drug:  febuxostat   Take 80 mg by mouth every morning.  Dose:  80 mg            Allergies  Allergies   Allergen Reactions   • Allopurinol       "Jan harvey's reaction     • Ertapenem      Hallucinations     • Lansoprazole Diarrhea     Diarrhea   • Oxycontin [Oxycodone]      Per pt, he \"feels out of it.\"       DIET  No orders of the defined types were placed in this encounter.      ACTIVITY  As tolerated.  Weight bearing as tolerated    CONSULTATIONS  none    PROCEDURES  Patient Name  Diego Serrano ([id number]) Sex  Male   1950   Room Bed Code Status   T733 01 Prior   Reprint Order Requisition     CT-EXTREMITY, LOWER W/O RIGHT (Order #[id number]) on 8/15/18   Last Resulted Time   Wed Aug 15, 2018 10:11 PM   Images     Show images for CT-EXTREMITY, LOWER W/O RIGHT   Imaging Result Status     Status: Final result (Exam End: 8/15/2018  9:33 PM)   Imaging Previous Results     Open Hard Copy Result Report (Order #[id number] - CT-EXTREMITY, LOWER W/O RIGHT)   Narrative       8/15/2018 8:50 PM    HISTORY/REASON FOR EXAM:  Right lower extremity redness and swelling since last night. History of osteomyelitis.      TECHNIQUE/EXAM DESCRIPTION AND NUMBER OF VIEWS:  CT scan of the RIGHT lower extremity without contrast and including reconstructions.    Thin-section noncontrast helical images were obtained. Coronal and sagittal reconstructions were generated from the axial images.    Up to date radiation dose reduction adjustments have been utilized to meet ALARA standards for radiation dose reduction.    COMPARISON: None.    FINDINGS:  There is diffuse subcutaneous soft tissue swelling and subcutaneous fat stranding of the lower leg, extending into the foot. No drainable fluid collection is identified. No sinus tracts identified.    No osseous destruction left tibia or fibula to suggest osteomyelitis.    Severe osteopenia with numerous lytic lucencies throughout the foot. Semiconstrained cemented total knee arthroplasty is well seated. No evidence of hardware complication involving the visualized components.    Changes of Charcot arthropathy in the foot with " osseous disorganization in the midfoot. 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 Lateral subluxation of first and third MTP joints. Amputation of the second digit at the proximal phalanx with osseous   destruction of the phalangeal remnant.    Atherosclerosis.    Diffuse decrease in muscle bulk.   Impression         1. Cellulitis of the and lower leg extending into the foot. No drainable soft tissue fluid collection. No radiographic evidence of tibial/fibular osteomyelitis.  2. Amputation of the second toe with osseous destruction of the second phalangeal remnant. This may be postoperative or represent osteomyelitis. Correlate clinically for wounds at this level.  3. Charcot arthropathy. No acute fracture.  4. Semiconstrained total knee arthroplasty is well seated.  5. Severe osteopenia with numerous lytic lucencies throughout the foot, likely related to osteopenia. Multiple myeloma can also have this appearance, but is considered less likely.   ----------------------------------------------------------------------------------------------------------------    Patient Information     Patient Name  Diego Serrano ([id number]) Sex  Male   1950   Room Bed Code Status   T733 01 Prior   Reprint Order Requisition     US-RENAL (Order #[id number]) on 18   Last Resulted Time   Thu Aug 16, 2018  5:24 AM   Images     Show images for US-RENAL   Imaging Result Status     Status: Final result (Exam End: 2018  4:08 AM)   Imaging Previous Results     Open Hard Copy Result Report (Order #[id number] - US-RENAL)   Narrative       2018 3:17 AM    HISTORY/REASON FOR EXAM:  Abnormal Labs    TECHNIQUE/EXAM DESCRIPTION:  Renal ultrasound.    COMPARISON:  None    FINDINGS:  The right kidney measures 11.86 cm.  The left kidney measures 12.31 cm.    There is no hydronephrosis.  There are no abnormal calcifications.    The bladder demonstrates no focal wall abnormality.    Enlarged prostate measures 5.7 x 4.5 x 5.9  cm.     Impression       1.  Unremarkable kidneys. No hydronephrosis.  2.  Prostatomegaly.           LABORATORY  Lab Results   Component Value Date    SODIUM 139 08/20/2018    POTASSIUM 3.7 08/20/2018    CHLORIDE 104 08/20/2018    CO2 25 08/20/2018    GLUCOSE 90 08/20/2018    BUN 45 (H) 08/20/2018    CREATININE 1.55 (H) 08/20/2018        Lab Results   Component Value Date    WBC 4.5 (L) 08/20/2018    HEMOGLOBIN 9.8 (L) 08/20/2018    HEMATOCRIT 32.2 (L) 08/20/2018    PLATELETCT 66 (L) 08/20/2018        Total time of the discharge process exceeds 39 minutes.

## 2018-08-28 LAB — EKG IMPRESSION: NORMAL
